# Patient Record
Sex: FEMALE | Race: WHITE | Employment: OTHER | URBAN - METROPOLITAN AREA
[De-identification: names, ages, dates, MRNs, and addresses within clinical notes are randomized per-mention and may not be internally consistent; named-entity substitution may affect disease eponyms.]

---

## 2008-10-28 LAB — COLONOSCOPY, EXTERNAL: NORMAL

## 2017-01-10 ENCOUNTER — OP HISTORICAL/CONVERTED ENCOUNTER (OUTPATIENT)
Dept: OTHER | Age: 82
End: 2017-01-10

## 2017-01-24 ENCOUNTER — OP HISTORICAL/CONVERTED ENCOUNTER (OUTPATIENT)
Dept: OTHER | Age: 82
End: 2017-01-24

## 2017-02-07 ENCOUNTER — OP HISTORICAL/CONVERTED ENCOUNTER (OUTPATIENT)
Dept: OTHER | Age: 82
End: 2017-02-07

## 2017-02-21 ENCOUNTER — OP HISTORICAL/CONVERTED ENCOUNTER (OUTPATIENT)
Dept: OTHER | Age: 82
End: 2017-02-21

## 2017-03-07 ENCOUNTER — OP HISTORICAL/CONVERTED ENCOUNTER (OUTPATIENT)
Dept: OTHER | Age: 82
End: 2017-03-07

## 2017-03-20 ENCOUNTER — OP HISTORICAL/CONVERTED ENCOUNTER (OUTPATIENT)
Dept: OTHER | Age: 82
End: 2017-03-20

## 2017-04-04 ENCOUNTER — OP HISTORICAL/CONVERTED ENCOUNTER (OUTPATIENT)
Dept: OTHER | Age: 82
End: 2017-04-04

## 2017-04-18 ENCOUNTER — OP HISTORICAL/CONVERTED ENCOUNTER (OUTPATIENT)
Dept: OTHER | Age: 82
End: 2017-04-18

## 2017-05-02 ENCOUNTER — OP HISTORICAL/CONVERTED ENCOUNTER (OUTPATIENT)
Dept: OTHER | Age: 82
End: 2017-05-02

## 2017-05-08 ENCOUNTER — OP HISTORICAL/CONVERTED ENCOUNTER (OUTPATIENT)
Dept: OTHER | Age: 82
End: 2017-05-08

## 2017-05-15 ENCOUNTER — OP HISTORICAL/CONVERTED ENCOUNTER (OUTPATIENT)
Dept: OTHER | Age: 82
End: 2017-05-15

## 2017-06-05 ENCOUNTER — OP HISTORICAL/CONVERTED ENCOUNTER (OUTPATIENT)
Dept: OTHER | Age: 82
End: 2017-06-05

## 2017-06-15 ENCOUNTER — OP HISTORICAL/CONVERTED ENCOUNTER (OUTPATIENT)
Dept: OTHER | Age: 82
End: 2017-06-15

## 2017-06-27 ENCOUNTER — OP HISTORICAL/CONVERTED ENCOUNTER (OUTPATIENT)
Dept: OTHER | Age: 82
End: 2017-06-27

## 2017-07-10 ENCOUNTER — OP HISTORICAL/CONVERTED ENCOUNTER (OUTPATIENT)
Dept: OTHER | Age: 82
End: 2017-07-10

## 2017-07-25 ENCOUNTER — OP HISTORICAL/CONVERTED ENCOUNTER (OUTPATIENT)
Dept: OTHER | Age: 82
End: 2017-07-25

## 2017-08-07 ENCOUNTER — OP HISTORICAL/CONVERTED ENCOUNTER (OUTPATIENT)
Dept: OTHER | Age: 82
End: 2017-08-07

## 2017-08-22 ENCOUNTER — OP HISTORICAL/CONVERTED ENCOUNTER (OUTPATIENT)
Dept: OTHER | Age: 82
End: 2017-08-22

## 2017-09-05 ENCOUNTER — OP HISTORICAL/CONVERTED ENCOUNTER (OUTPATIENT)
Dept: OTHER | Age: 82
End: 2017-09-05

## 2017-09-19 ENCOUNTER — OP HISTORICAL/CONVERTED ENCOUNTER (OUTPATIENT)
Dept: OTHER | Age: 82
End: 2017-09-19

## 2017-10-05 ENCOUNTER — OP HISTORICAL/CONVERTED ENCOUNTER (OUTPATIENT)
Dept: OTHER | Age: 82
End: 2017-10-05

## 2017-10-17 ENCOUNTER — OP HISTORICAL/CONVERTED ENCOUNTER (OUTPATIENT)
Dept: OTHER | Age: 82
End: 2017-10-17

## 2017-10-31 ENCOUNTER — OP HISTORICAL/CONVERTED ENCOUNTER (OUTPATIENT)
Dept: OTHER | Age: 82
End: 2017-10-31

## 2017-11-14 ENCOUNTER — OP HISTORICAL/CONVERTED ENCOUNTER (OUTPATIENT)
Dept: OTHER | Age: 82
End: 2017-11-14

## 2017-11-28 ENCOUNTER — OP HISTORICAL/CONVERTED ENCOUNTER (OUTPATIENT)
Dept: OTHER | Age: 82
End: 2017-11-28

## 2017-12-12 ENCOUNTER — OP HISTORICAL/CONVERTED ENCOUNTER (OUTPATIENT)
Dept: OTHER | Age: 82
End: 2017-12-12

## 2017-12-27 ENCOUNTER — OP HISTORICAL/CONVERTED ENCOUNTER (OUTPATIENT)
Dept: OTHER | Age: 82
End: 2017-12-27

## 2018-01-02 ENCOUNTER — OP HISTORICAL/CONVERTED ENCOUNTER (OUTPATIENT)
Dept: OTHER | Age: 83
End: 2018-01-02

## 2018-01-09 ENCOUNTER — OP HISTORICAL/CONVERTED ENCOUNTER (OUTPATIENT)
Dept: OTHER | Age: 83
End: 2018-01-09

## 2018-01-23 ENCOUNTER — OP HISTORICAL/CONVERTED ENCOUNTER (OUTPATIENT)
Dept: OTHER | Age: 83
End: 2018-01-23

## 2018-01-30 ENCOUNTER — OP HISTORICAL/CONVERTED ENCOUNTER (OUTPATIENT)
Dept: OTHER | Age: 83
End: 2018-01-30

## 2018-02-12 ENCOUNTER — OP HISTORICAL/CONVERTED ENCOUNTER (OUTPATIENT)
Dept: OTHER | Age: 83
End: 2018-02-12

## 2018-02-27 ENCOUNTER — OP HISTORICAL/CONVERTED ENCOUNTER (OUTPATIENT)
Dept: OTHER | Age: 83
End: 2018-02-27

## 2018-03-12 ENCOUNTER — OP HISTORICAL/CONVERTED ENCOUNTER (OUTPATIENT)
Dept: OTHER | Age: 83
End: 2018-03-12

## 2018-03-26 ENCOUNTER — OP HISTORICAL/CONVERTED ENCOUNTER (OUTPATIENT)
Dept: OTHER | Age: 83
End: 2018-03-26

## 2018-04-10 ENCOUNTER — OP HISTORICAL/CONVERTED ENCOUNTER (OUTPATIENT)
Dept: OTHER | Age: 83
End: 2018-04-10

## 2018-04-24 ENCOUNTER — OP HISTORICAL/CONVERTED ENCOUNTER (OUTPATIENT)
Dept: OTHER | Age: 83
End: 2018-04-24

## 2018-05-15 ENCOUNTER — OP HISTORICAL/CONVERTED ENCOUNTER (OUTPATIENT)
Dept: OTHER | Age: 83
End: 2018-05-15

## 2018-05-30 ENCOUNTER — OP HISTORICAL/CONVERTED ENCOUNTER (OUTPATIENT)
Dept: OTHER | Age: 83
End: 2018-05-30

## 2018-06-02 ENCOUNTER — IP HISTORICAL/CONVERTED ENCOUNTER (OUTPATIENT)
Dept: OTHER | Age: 83
End: 2018-06-02

## 2018-06-21 ENCOUNTER — OP HISTORICAL/CONVERTED ENCOUNTER (OUTPATIENT)
Dept: OTHER | Age: 83
End: 2018-06-21

## 2018-07-05 ENCOUNTER — OP HISTORICAL/CONVERTED ENCOUNTER (OUTPATIENT)
Dept: OTHER | Age: 83
End: 2018-07-05

## 2018-07-17 ENCOUNTER — OP HISTORICAL/CONVERTED ENCOUNTER (OUTPATIENT)
Dept: OTHER | Age: 83
End: 2018-07-17

## 2018-07-31 ENCOUNTER — OP HISTORICAL/CONVERTED ENCOUNTER (OUTPATIENT)
Dept: OTHER | Age: 83
End: 2018-07-31

## 2018-08-07 ENCOUNTER — OP HISTORICAL/CONVERTED ENCOUNTER (OUTPATIENT)
Dept: OTHER | Age: 83
End: 2018-08-07

## 2018-08-13 ENCOUNTER — OP HISTORICAL/CONVERTED ENCOUNTER (OUTPATIENT)
Dept: OTHER | Age: 83
End: 2018-08-13

## 2018-08-17 ENCOUNTER — OP HISTORICAL/CONVERTED ENCOUNTER (OUTPATIENT)
Dept: OTHER | Age: 83
End: 2018-08-17

## 2018-08-19 LAB — MAMMOGRAPHY, EXTERNAL: NORMAL

## 2018-08-28 ENCOUNTER — OP HISTORICAL/CONVERTED ENCOUNTER (OUTPATIENT)
Dept: OTHER | Age: 83
End: 2018-08-28

## 2018-09-11 ENCOUNTER — OP HISTORICAL/CONVERTED ENCOUNTER (OUTPATIENT)
Dept: OTHER | Age: 83
End: 2018-09-11

## 2018-09-18 ENCOUNTER — OP HISTORICAL/CONVERTED ENCOUNTER (OUTPATIENT)
Dept: OTHER | Age: 83
End: 2018-09-18

## 2018-09-26 ENCOUNTER — OP HISTORICAL/CONVERTED ENCOUNTER (OUTPATIENT)
Dept: OTHER | Age: 83
End: 2018-09-26

## 2018-10-12 ENCOUNTER — OP HISTORICAL/CONVERTED ENCOUNTER (OUTPATIENT)
Dept: OTHER | Age: 83
End: 2018-10-12

## 2018-10-19 ENCOUNTER — OP HISTORICAL/CONVERTED ENCOUNTER (OUTPATIENT)
Dept: OTHER | Age: 83
End: 2018-10-19

## 2018-11-08 ENCOUNTER — OP HISTORICAL/CONVERTED ENCOUNTER (OUTPATIENT)
Dept: OTHER | Age: 83
End: 2018-11-08

## 2018-11-20 ENCOUNTER — OP HISTORICAL/CONVERTED ENCOUNTER (OUTPATIENT)
Dept: OTHER | Age: 83
End: 2018-11-20

## 2018-12-04 ENCOUNTER — OP HISTORICAL/CONVERTED ENCOUNTER (OUTPATIENT)
Dept: OTHER | Age: 83
End: 2018-12-04

## 2018-12-18 ENCOUNTER — OP HISTORICAL/CONVERTED ENCOUNTER (OUTPATIENT)
Dept: OTHER | Age: 83
End: 2018-12-18

## 2019-01-02 ENCOUNTER — OP HISTORICAL/CONVERTED ENCOUNTER (OUTPATIENT)
Dept: OTHER | Age: 84
End: 2019-01-02

## 2019-01-15 ENCOUNTER — OP HISTORICAL/CONVERTED ENCOUNTER (OUTPATIENT)
Dept: OTHER | Age: 84
End: 2019-01-15

## 2019-01-28 ENCOUNTER — OP HISTORICAL/CONVERTED ENCOUNTER (OUTPATIENT)
Dept: OTHER | Age: 84
End: 2019-01-28

## 2019-02-01 ENCOUNTER — OP HISTORICAL/CONVERTED ENCOUNTER (OUTPATIENT)
Dept: OTHER | Age: 84
End: 2019-02-01

## 2019-05-03 ENCOUNTER — OP HISTORICAL/CONVERTED ENCOUNTER (OUTPATIENT)
Dept: OTHER | Age: 84
End: 2019-05-03

## 2019-08-19 ENCOUNTER — OP HISTORICAL/CONVERTED ENCOUNTER (OUTPATIENT)
Dept: OTHER | Age: 84
End: 2019-08-19

## 2020-09-24 ENCOUNTER — HOSPITAL ENCOUNTER (OUTPATIENT)
Dept: MAMMOGRAPHY | Age: 85
Discharge: HOME | End: 2020-09-24
Attending: INTERNAL MEDICINE
Payer: MEDICARE

## 2020-09-24 DIAGNOSIS — Z12.31 ENCOUNTER FOR SCREENING MAMMOGRAM FOR MALIGNANT NEOPLASM OF BREAST: ICD-10-CM

## 2020-09-24 PROCEDURE — 77067 SCR MAMMO BI INCL CAD: CPT

## 2021-07-06 ENCOUNTER — TRANSCRIBE ORDER (OUTPATIENT)
Dept: SCHEDULING | Age: 86
End: 2021-07-06

## 2021-07-06 DIAGNOSIS — M67.813 OTHER SPECIFIED DISORDERS OF TENDON, RIGHT SHOULDER: Primary | ICD-10-CM

## 2021-07-07 ENCOUNTER — HOSPITAL ENCOUNTER (OUTPATIENT)
Dept: MRI IMAGING | Age: 86
Discharge: HOME | End: 2021-07-07
Attending: ORTHOPAEDIC SURGERY
Payer: MEDICARE

## 2021-07-07 DIAGNOSIS — M67.813 OTHER SPECIFIED DISORDERS OF TENDON, RIGHT SHOULDER: ICD-10-CM

## 2021-07-07 PROCEDURE — 73221 MRI JOINT UPR EXTREM W/O DYE: CPT

## 2021-08-02 ENCOUNTER — HOSPITAL ENCOUNTER (OUTPATIENT)
Dept: PHYSICAL THERAPY | Age: 86
Discharge: HOME | End: 2021-08-02
Payer: MEDICARE

## 2021-08-02 PROCEDURE — 97110 THERAPEUTIC EXERCISES: CPT

## 2021-08-02 PROCEDURE — 97161 PT EVAL LOW COMPLEX 20 MIN: CPT

## 2021-08-02 NOTE — PROGRESS NOTES
W . [address] [place] [address], [address] Charlene Baird  Phone: [phone]   Fax: [phone]    PT INITIAL EVALUATION NOTE - [facility] 2-15  Plan of Care/Statement of Necessity for Physical Therapy Services  2-15    Patient Name: Alma Murphy  Date:2021  : 1933  [x]  Patient  Verified  Provider#: [id number]  Payor: [place] MEDICARE / Plan: [place] MEDICARE PART A & B / Product Type: Medicare /    Referral source: Seema Cohen MD   In time:300 pm  Out time:330 pm  Total Treatment Time (min): 30  Total Timed Codes (min): 10  1:1 Treatment Time ([address] only): 10   Visit #: 1      Start of Care: 21      Onset Date: 21     Treatment Area/Diagnosis: Pain in right shoulder [M25.511]    SUBJECTIVE  Pain Level (0-10 scale): 0                Best: 0           Worst:  0  Description: no pain   Any medication changes, allergies to medications, adverse drug reactions, diagnosis change, or new procedure performed?: [] No    [x] Yes (see summary sheet for update)    Subjective:    On 21 - patient fell when she misjudged a step and fractured her R collar bone. She was taken to [facility] with x-ray of head and shoulder which revealed fracture of her collar bone. She then saw an orthopedic MD and wore a sling for 2 weeks. On a f/u with her MD on 21, the MD said that he did not see evidence of a fracture, discontinued the use of the sling and referred patient to PT. She feels that she does not need PT because she is back to doing everything with out issue, but hasn't ironed yet because she is unsure of getting her \"old\" ironing board out and setting it up. Her daughter has been driving her to appointments, doing her laundry and grocery shopping. Her daughter feels that she is not back to her \"normal\" isn't letting her drive. Daughter also feels that she needs PT. Patient is R hand dominant.     Patient reports no difficulty with any ADLS other than ironing. PLOF: independent living including driving, went to exercise class at [address] of Injury: fall  Previous Treatment/Compliance: sling - complaint  PMHx:  Surgical Hx: heart disease, depression arthritis, thyroid problem, asthma?   Prior Hospitalization: see medical history   Medications: Verified on Patient Summary List  Work Hx: Retired  Living Situation: Lives alone in senior apartment  Pt Goals: doesn't think she needs PT  Barriers: none  Motivation: good  Substance use: yes - tobacco  Cognition: A & O x 3        OBJECTIVE/EXAMINATION  Posture:  Depressed R shoulder  Functional  and Pinch:  WFL  Palpation: slight tenderness over R biceps    Specific joints: *normal values in ()    SHOULDER                                         AROM   PROM            MMT   R L R L R L   Flexion (180)   142 deg   140 deg   150 deg   150 deg     Extension (60)   wfl   wfl       Abduction (180)   140   140       Adduction (0)         IR (70)   65   65     4  4+   ER (90)   75   75     4   4+   Horizontal Abduction (130)         Horizontal Adduction (45)         Additional comments: no pain with any movement, scapular elevation and retraction were WFL and painfree    ELBOW                             AROM                             PROM                             MMT   R L R L R L   Flexion (150)   wfl   wfl     4+   4+   Extension (0)   wfl   wfl     4+   4+   Additional comments: no pain        Neurological: Sensations: grossly intact to light touch    10 min Therapeutic Exercise:  [x] See flow sheet :   Rationale: increase ROM and increase strength to improve the patients ability to return to driving and ironing          With   [] TE   [] TA   [] neuro   [] other: Patient Education: [x] Review HEP    [] Progressed/Changed HEP based on:   [] positioning   [] body mechanics   [] transfers   [] heat/ice application    [] other:      TU sec    Pain Level (0-10 scale) post treatment: 0    ASSESSMENT/Key Information/Changes in Function:   Patient is 80year old who is referred to PT with diagnosis of R shoulder pain. Patient sustained a fall on 6/27/21 when she misjudged a step and fell onto her R side injuring her R clavicle. She was taken to [facility] and had x-rays which diagnosed a fracture in her R clavicle. She was then seen by ortho who had her wear a sling for 2 weeks. After wearing the sling, she has been doing ROM exercises on her own and is now back to AROM equal to the L UE. Her MMT was slightly decreased but not significantly. She only had mild tenderness over the R biceps. She reports that she is independent with her ADLS but hasn't tried getting her ironing board out and ironing. Her daughter has been doing her laundry, grocery shopping and driving. She should benefit from skilled PT to improve the few impairments listed above so that she is able to do the last of her ADLS independently. Problem List: decrease strength and decrease ADL/ functional abilitiies    Short Term Goals: To be accomplished in 3 weeks:   1. Independent with HEP. 2.  Strength equal to L UE. Long Term Goals: To be accomplished in 6 weeks:   1. Resume ironing without difficulty. 2.  Resume grocery shopping without difficulty. 3.  Resume laundry without difficulty. 4.  Resume driving without difficulty.   Patient Goal (s): back to all her normal activities    Evaluation Complexity History LOW Complexity : Zero comorbidities / personal factors that will impact the outcome / POC; Examination LOW Complexity : 1-2 Standardized tests and measures addressing body structure, function, activity limitation and / or participation in recreation  ;Presentation LOW Complexity : Stable, uncomplicated  ;Clinical Decision Making Other outcome measures [facility] 25.96%  LOW   Overall Complexity Rating: LOW      Patient / Family readiness to learn indicated by: asking questions, trying to perform skills and interest  Persons(s) to be included in education: patient (P) and family support person (FSP);list daughter  Barriers to Learning/Limitations: None  Patient Self Reported Health Status: poor  Rehabilitation Potential: excellent  Patient/ Caregiver education and instruction: exercises      PLAN:  Treatment Plan may include any combination of the following: Therapeutic exercise, Manual therapy and Patient education  HEP Issued: see enclosed copy    Frequency / Duration: Patient to be seen 2 times per week for 4-6 weeks. [x]  Plan of care has been reviewed with PTA    Certification Period: 8/2/21  to  10/2/21    Chio Gardner, PT 8/2/2021     ________________________________________________________________________    I certify that the above Therapy Services are being furnished while the patient is under my care. I agree with the treatment plan and certify that this therapy is necessary.     Physician's Signature:____________________  Date:____________Time: _________            Gabby Saenz MD

## 2021-08-03 ENCOUNTER — APPOINTMENT (OUTPATIENT)
Dept: PHYSICAL THERAPY | Age: 86
End: 2021-08-03
Payer: MEDICARE

## 2021-08-05 ENCOUNTER — HOSPITAL ENCOUNTER (OUTPATIENT)
Dept: PHYSICAL THERAPY | Age: 86
Discharge: HOME | End: 2021-08-05
Payer: MEDICARE

## 2021-08-05 PROCEDURE — 97110 THERAPEUTIC EXERCISES: CPT

## 2021-08-05 NOTE — PROGRESS NOTES
PT DAILY TREATMENT NOTE - [facility] 2-15    Patient Name: Chao Young  Date:2021  : 1933  [x]  Patient  Verified  Payor: VA MEDICARE / Plan: VA MEDICARE PART A & B / Product Type: Medicare /    In time:  Out time:165  Total Treatment Time (min): 35  Total Timed Codes (min): 25  1:1 Treatment Time ( only): 35   Visit #: 2 2    Treatment Area: Pain in right shoulder [M25.511]    SUBJECTIVE  Pain Level (0-10 scale): 0  Any medication changes, allergies to medications, adverse drug reactions, diagnosis change, or new procedure performed?: [x] No    [] Yes (see summary sheet for update)  Subjective functional status/changes:   [] No changes reported  I am worn out today. OBJECTIVE      25 min Therapeutic Exercise:  [x] See flow sheet :   Rationale: increase ROM, increase strength, improve coordination, improve balance and increase proprioception to improve the patients ability to perform ADL's and IADL's. With   [] TE   [] TA   [] Neuro   [] SC   [] other: Patient Education: [x] Review HEP    [] Progressed/Changed HEP based on:   [] positioning   [] body mechanics   [] transfers   [] heat/ice application    [] other:      Other Objective/Functional Measures: none    Pain Level (0-10 scale) post treatment: 0    ASSESSMENT/Changes in Function:   Exhibits normal AAROM for shoulder flexion abduction. Fatigued with UBE. Reviewed HEP Ankle pumps, LAQ, MARCH, HIP ABD, SIT to stand. Standing Head, toe, knee toe and demo good form and balance. Patient will continue to benefit from skilled PT services to address functional mobility deficits to attain remaining goals. [x]  See Plan of Care  []  See progress note/recertification  []  See Discharge Summary         Progress towards goals / Updated goals:  Short Term Goals: To be accomplished in 3 weeks:              1.  Independent with HEP. 2.  Strength equal to L UE. Long Term Goals:  To be accomplished in 6 weeks:              1.  Resume ironing without difficulty. 2.  Resume grocery shopping without difficulty. 3.  Resume laundry without difficulty. 4.  Resume driving without difficulty.   Patient Goal (s): back to all her normal activities    PLAN  []  Upgrade activities as tolerated     [x]  Continue plan of care  []  Update interventions per flow sheet       []  Discharge due to:_  []  Other:_      Shae Ellison, PTA 8/5/2021

## 2021-08-09 ENCOUNTER — HOSPITAL ENCOUNTER (OUTPATIENT)
Dept: PHYSICAL THERAPY | Age: 86
Discharge: HOME | End: 2021-08-09
Payer: MEDICARE

## 2021-08-09 PROCEDURE — 97110 THERAPEUTIC EXERCISES: CPT

## 2021-08-09 NOTE — PROGRESS NOTES
PT DAILY TREATMENT NOTE - [facility] 2-15    Patient Name: Dana Longoria  Date:2021  : 1933  [x]  Patient  Verified  Payor: VA MEDICARE / Plan: VA MEDICARE PART A & B / Product Type: Medicare /    In time:1054  Out time:1130  Total Treatment Time (min): 30  Total Timed Codes (min): 30  1:1 Treatment Time ( only): 30   Visit #: 3 3    Treatment Area: Pain in right shoulder [M25.511]    SUBJECTIVE  Pain Level (0-10 scale): 0  Any medication changes, allergies to medications, adverse drug reactions, diagnosis change, or new procedure performed?: [x] No    [] Yes (see summary sheet for update)  Subjective functional status/changes:   [] No changes reported  My nerves are just shot this morning. I got [employer] transportation to take me. I thought my appointment was tomorrow. OBJECTIVE      30 min Therapeutic Exercise:  [x] See flow sheet :   Rationale: increase ROM, increase strength, improve coordination, improve balance and increase proprioception to improve the patients ability to perform ADL's and IADL's. With   [] TE   [] TA   [] Neuro   [] SC   [] other: Patient Education: [x] Review HEP    [] Progressed/Changed HEP based on:   [] positioning   [] body mechanics   [] transfers   [] heat/ice application    [] other:      Other Objective/Functional Measures: none    Pain Level (0-10 scale) post treatment: 0    ASSESSMENT/Changes in Function:   Pt. Very distraught this morning. Assessed SPO2 92% PULSE 89BPM /82. Very fatigued at end of therapy. Performed all TE WNL. Patient will continue to benefit from skilled PT services to address functional mobility deficits to attain remaining goals. [x]  See Plan of Care  []  See progress note/recertification  []  See Discharge Summary         Progress towards goals / Updated goals:  Short Term Goals: To be accomplished in 3 weeks:              1.  Independent with HEP. 2.  Strength equal to L UE. Long Term Goals:  To be accomplished in 6 weeks:              1.  Resume ironing without difficulty. 2.  Resume grocery shopping without difficulty. 3.  Resume laundry without difficulty. 4.  Resume driving without difficulty.   Patient Goal (s): back to all her normal activities    PLAN  []  Upgrade activities as tolerated     [x]  Continue plan of care  []  Update interventions per flow sheet       []  Discharge due to:_  []  Other:_      Sea Hess, PTA 8/9/2021

## 2021-08-12 ENCOUNTER — APPOINTMENT (OUTPATIENT)
Dept: PHYSICAL THERAPY | Age: 86
End: 2021-08-12
Payer: MEDICARE

## 2021-08-18 ENCOUNTER — HOSPITAL ENCOUNTER (INPATIENT)
Age: 86
LOS: 4 days | Discharge: NURSING, REHAB OR LONG-TERM CARE | DRG: 193 | End: 2021-08-23
Attending: STUDENT IN AN ORGANIZED HEALTH CARE EDUCATION/TRAINING PROGRAM | Admitting: INTERNAL MEDICINE
Payer: MEDICARE

## 2021-08-18 DIAGNOSIS — D72.829 LEUKOCYTOSIS, UNSPECIFIED TYPE: ICD-10-CM

## 2021-08-18 DIAGNOSIS — J18.9 COMMUNITY ACQUIRED PNEUMONIA, UNSPECIFIED LATERALITY: Primary | ICD-10-CM

## 2021-08-18 DIAGNOSIS — E83.42 HYPOMAGNESEMIA: ICD-10-CM

## 2021-08-18 DIAGNOSIS — E87.6 HYPOKALEMIA: ICD-10-CM

## 2021-08-18 DIAGNOSIS — R79.89 ELEVATED BRAIN NATRIURETIC PEPTIDE (BNP) LEVEL: ICD-10-CM

## 2021-08-18 PROCEDURE — 99285 EMERGENCY DEPT VISIT HI MDM: CPT

## 2021-08-18 NOTE — Clinical Note
Status[de-identified] INPATIENT [101]   Type of Bed: Medical [8]   Cardiac Monitoring Required?: Yes   Inpatient Hospitalization Certified Necessary for the Following Reasons: 3.  Patient receiving treatment that can only be provided in an inpatient setting (further clarification in H&P documentation)   Admitting Diagnosis: Hypokalemia [463709]   Admitting Diagnosis: Hypomagnesemia [002368]   Admitting Diagnosis: Pneumonia [469614]   Admitting Physician: Virgie Aguirre   Attending Physician: Virgie Aguirre   Estimated Length of Stay: 2 Midnights   Discharge Plan[de-identified] Home with Office Follow-up

## 2021-08-19 ENCOUNTER — APPOINTMENT (OUTPATIENT)
Dept: GENERAL RADIOLOGY | Age: 86
DRG: 193 | End: 2021-08-19
Attending: STUDENT IN AN ORGANIZED HEALTH CARE EDUCATION/TRAINING PROGRAM
Payer: MEDICARE

## 2021-08-19 PROBLEM — R94.31 ABNORMAL EKG: Status: ACTIVE | Noted: 2021-08-19

## 2021-08-19 PROBLEM — E83.42 HYPOMAGNESEMIA: Status: ACTIVE | Noted: 2021-08-19

## 2021-08-19 PROBLEM — E87.1 HYPONATREMIA: Status: ACTIVE | Noted: 2021-08-19

## 2021-08-19 PROBLEM — J18.9 PNEUMONIA: Status: ACTIVE | Noted: 2021-08-19

## 2021-08-19 PROBLEM — E87.6 HYPOKALEMIA: Status: ACTIVE | Noted: 2021-08-19

## 2021-08-19 LAB
ANION GAP SERPL CALC-SCNC: 5 MMOL/L (ref 5–15)
ANION GAP SERPL CALC-SCNC: 7 MMOL/L (ref 5–15)
ANION GAP SERPL CALC-SCNC: 9 MMOL/L (ref 5–15)
APPEARANCE UR: CLEAR
APTT PPP: 65.5 SEC (ref 21.2–34.1)
BACTERIA URNS QL MICRO: NEGATIVE /HPF
BASOPHILS # BLD: 0.1 K/UL (ref 0–0.1)
BASOPHILS NFR BLD: 0 % (ref 0–1)
BILIRUB UR QL: NEGATIVE
BNP SERPL-MCNC: 1874 PG/ML
BUN SERPL-MCNC: 13 MG/DL (ref 6–20)
BUN SERPL-MCNC: 8 MG/DL (ref 6–20)
BUN SERPL-MCNC: 9 MG/DL (ref 6–20)
BUN/CREAT SERPL: 14 (ref 12–20)
BUN/CREAT SERPL: 14 (ref 12–20)
BUN/CREAT SERPL: 22 (ref 12–20)
CA-I BLD-MCNC: 7.3 MG/DL (ref 8.5–10.1)
CA-I BLD-MCNC: 7.4 MG/DL (ref 8.5–10.1)
CA-I BLD-MCNC: 7.7 MG/DL (ref 8.5–10.1)
CHLORIDE SERPL-SCNC: 86 MMOL/L (ref 97–108)
CHLORIDE SERPL-SCNC: 92 MMOL/L (ref 97–108)
CHLORIDE SERPL-SCNC: 95 MMOL/L (ref 97–108)
CO2 SERPL-SCNC: 30 MMOL/L (ref 21–32)
CO2 SERPL-SCNC: 30 MMOL/L (ref 21–32)
CO2 SERPL-SCNC: 31 MMOL/L (ref 21–32)
COLOR UR: NORMAL
COVID-19 RAPID TEST, COVR: NOT DETECTED
CREAT SERPL-MCNC: 0.59 MG/DL (ref 0.55–1.02)
CREAT SERPL-MCNC: 0.6 MG/DL (ref 0.55–1.02)
CREAT SERPL-MCNC: 0.64 MG/DL (ref 0.55–1.02)
D DIMER PPP FEU-MCNC: 0.72 UG/ML(FEU)
DIFFERENTIAL METHOD BLD: ABNORMAL
EOSINOPHIL # BLD: 0.1 K/UL (ref 0–0.4)
EOSINOPHIL NFR BLD: 0 % (ref 0–7)
ERYTHROCYTE [DISTWIDTH] IN BLOOD BY AUTOMATED COUNT: 12.2 % (ref 11.5–14.5)
GLUCOSE SERPL-MCNC: 119 MG/DL (ref 65–100)
GLUCOSE SERPL-MCNC: 128 MG/DL (ref 65–100)
GLUCOSE SERPL-MCNC: 141 MG/DL (ref 65–100)
GLUCOSE UR STRIP.AUTO-MCNC: NEGATIVE MG/DL
HCT VFR BLD AUTO: 32.5 % (ref 35–47)
HGB BLD-MCNC: 11.2 G/DL (ref 11.5–16)
HGB UR QL STRIP: NEGATIVE
IMM GRANULOCYTES # BLD AUTO: 0.1 K/UL (ref 0–0.04)
IMM GRANULOCYTES NFR BLD AUTO: 1 % (ref 0–0.5)
INR PPP: 2.7 (ref 0.9–1.1)
KETONES UR QL STRIP.AUTO: NEGATIVE MG/DL
LEUKOCYTE ESTERASE UR QL STRIP.AUTO: NEGATIVE
LYMPHOCYTES # BLD: 2.9 K/UL (ref 0.8–3.5)
LYMPHOCYTES NFR BLD: 15 % (ref 12–49)
MAGNESIUM SERPL-MCNC: 0.8 MG/DL (ref 1.6–2.4)
MAGNESIUM SERPL-MCNC: 2.2 MG/DL (ref 1.6–2.4)
MAGNESIUM SERPL-MCNC: 8.3 MG/DL (ref 1.6–2.4)
MCH RBC QN AUTO: 30.9 PG (ref 26–34)
MCHC RBC AUTO-ENTMCNC: 34.5 G/DL (ref 30–36.5)
MCV RBC AUTO: 89.5 FL (ref 80–99)
MONOCYTES # BLD: 1.4 K/UL (ref 0–1)
MONOCYTES NFR BLD: 7 % (ref 5–13)
NEUTS SEG # BLD: 15.2 K/UL (ref 1.8–8)
NEUTS SEG NFR BLD: 77 % (ref 32–75)
NITRITE UR QL STRIP.AUTO: NEGATIVE
NRBC # BLD: 0 K/UL (ref 0–0.01)
NRBC BLD-RTO: 0 PER 100 WBC
PH UR STRIP: 6 [PH] (ref 5–8)
PLATELET # BLD AUTO: 252 K/UL (ref 150–400)
PMV BLD AUTO: 9.9 FL (ref 8.9–12.9)
POTASSIUM SERPL-SCNC: 2.9 MMOL/L (ref 3.5–5.1)
POTASSIUM SERPL-SCNC: 3.4 MMOL/L (ref 3.5–5.1)
POTASSIUM SERPL-SCNC: 3.8 MMOL/L (ref 3.5–5.1)
PROT UR STRIP-MCNC: NEGATIVE MG/DL
PROTHROMBIN TIME: 28.7 SEC (ref 11.9–14.7)
RBC # BLD AUTO: 3.63 M/UL (ref 3.8–5.2)
RBC #/AREA URNS HPF: NORMAL /HPF (ref 0–5)
SARS-COV-2, COV2: NORMAL
SODIUM SERPL-SCNC: 125 MMOL/L (ref 136–145)
SODIUM SERPL-SCNC: 129 MMOL/L (ref 136–145)
SODIUM SERPL-SCNC: 131 MMOL/L (ref 136–145)
SP GR UR REFRACTOMETRY: <1.005 (ref 1–1.03)
SPECIMEN SOURCE: NORMAL
THERAPEUTIC RANGE,PTTT: ABNORMAL SEC (ref 82–109)
TROPONIN I SERPL-MCNC: <0.05 NG/ML
TROPONIN I SERPL-MCNC: <0.05 NG/ML
UA: UC IF INDICATED,UAUC: NORMAL
UROBILINOGEN UR QL STRIP.AUTO: 0.1 EU/DL (ref 0.1–1)
WBC # BLD AUTO: 19.7 K/UL (ref 3.6–11)
WBC URNS QL MICRO: NORMAL /HPF (ref 0–4)

## 2021-08-19 PROCEDURE — 80048 BASIC METABOLIC PNL TOTAL CA: CPT

## 2021-08-19 PROCEDURE — 96376 TX/PRO/DX INJ SAME DRUG ADON: CPT

## 2021-08-19 PROCEDURE — 85730 THROMBOPLASTIN TIME PARTIAL: CPT

## 2021-08-19 PROCEDURE — 65270000029 HC RM PRIVATE

## 2021-08-19 PROCEDURE — 74011250637 HC RX REV CODE- 250/637: Performed by: STUDENT IN AN ORGANIZED HEALTH CARE EDUCATION/TRAINING PROGRAM

## 2021-08-19 PROCEDURE — 74011250636 HC RX REV CODE- 250/636: Performed by: HOSPITALIST

## 2021-08-19 PROCEDURE — 85379 FIBRIN DEGRADATION QUANT: CPT

## 2021-08-19 PROCEDURE — 83880 ASSAY OF NATRIURETIC PEPTIDE: CPT

## 2021-08-19 PROCEDURE — 93005 ELECTROCARDIOGRAM TRACING: CPT

## 2021-08-19 PROCEDURE — 81001 URINALYSIS AUTO W/SCOPE: CPT

## 2021-08-19 PROCEDURE — 74011000258 HC RX REV CODE- 258: Performed by: INTERNAL MEDICINE

## 2021-08-19 PROCEDURE — 83735 ASSAY OF MAGNESIUM: CPT

## 2021-08-19 PROCEDURE — 74011000250 HC RX REV CODE- 250: Performed by: INTERNAL MEDICINE

## 2021-08-19 PROCEDURE — 74011000250 HC RX REV CODE- 250: Performed by: STUDENT IN AN ORGANIZED HEALTH CARE EDUCATION/TRAINING PROGRAM

## 2021-08-19 PROCEDURE — 74011250637 HC RX REV CODE- 250/637: Performed by: INTERNAL MEDICINE

## 2021-08-19 PROCEDURE — 85610 PROTHROMBIN TIME: CPT

## 2021-08-19 PROCEDURE — 74011636637 HC RX REV CODE- 636/637: Performed by: INTERNAL MEDICINE

## 2021-08-19 PROCEDURE — 51702 INSERT TEMP BLADDER CATH: CPT

## 2021-08-19 PROCEDURE — 87635 SARS-COV-2 COVID-19 AMP PRB: CPT

## 2021-08-19 PROCEDURE — 85025 COMPLETE CBC W/AUTO DIFF WBC: CPT

## 2021-08-19 PROCEDURE — 74011250636 HC RX REV CODE- 250/636: Performed by: STUDENT IN AN ORGANIZED HEALTH CARE EDUCATION/TRAINING PROGRAM

## 2021-08-19 PROCEDURE — 74011250636 HC RX REV CODE- 250/636: Performed by: INTERNAL MEDICINE

## 2021-08-19 PROCEDURE — 71045 X-RAY EXAM CHEST 1 VIEW: CPT

## 2021-08-19 PROCEDURE — 96365 THER/PROPH/DIAG IV INF INIT: CPT

## 2021-08-19 PROCEDURE — 84484 ASSAY OF TROPONIN QUANT: CPT

## 2021-08-19 PROCEDURE — 96375 TX/PRO/DX INJ NEW DRUG ADDON: CPT

## 2021-08-19 PROCEDURE — 36415 COLL VENOUS BLD VENIPUNCTURE: CPT

## 2021-08-19 RX ORDER — DILTIAZEM HYDROCHLORIDE 120 MG/1
240 CAPSULE, COATED, EXTENDED RELEASE ORAL DAILY
Status: DISCONTINUED | OUTPATIENT
Start: 2021-08-19 | End: 2021-08-23 | Stop reason: HOSPADM

## 2021-08-19 RX ORDER — POTASSIUM CHLORIDE 20 MEQ/1
40 TABLET, EXTENDED RELEASE ORAL
Status: COMPLETED | OUTPATIENT
Start: 2021-08-19 | End: 2021-08-19

## 2021-08-19 RX ORDER — TRAMADOL HYDROCHLORIDE 50 MG/1
50 TABLET ORAL
COMMUNITY
Start: 2021-07-09

## 2021-08-19 RX ORDER — DILTIAZEM HYDROCHLORIDE 120 MG/1
240 CAPSULE, EXTENDED RELEASE ORAL
COMMUNITY
Start: 2021-02-08 | End: 2022-02-03

## 2021-08-19 RX ORDER — PROPRANOLOL HYDROCHLORIDE 20 MG/1
10 TABLET ORAL 2 TIMES DAILY
Status: DISCONTINUED | OUTPATIENT
Start: 2021-08-19 | End: 2021-08-23 | Stop reason: HOSPADM

## 2021-08-19 RX ORDER — ACETAMINOPHEN 325 MG/1
650 TABLET ORAL
Status: DISCONTINUED | OUTPATIENT
Start: 2021-08-19 | End: 2021-08-19

## 2021-08-19 RX ORDER — WARFARIN 3 MG/1
3 TABLET ORAL ONCE
Status: COMPLETED | OUTPATIENT
Start: 2021-08-19 | End: 2021-08-19

## 2021-08-19 RX ORDER — DULOXETIN HYDROCHLORIDE 30 MG/1
60 CAPSULE, DELAYED RELEASE ORAL DAILY
Status: DISCONTINUED | OUTPATIENT
Start: 2021-08-19 | End: 2021-08-23 | Stop reason: HOSPADM

## 2021-08-19 RX ORDER — SODIUM CHLORIDE 9 MG/ML
75 INJECTION, SOLUTION INTRAVENOUS CONTINUOUS
Status: DISPENSED | OUTPATIENT
Start: 2021-08-19 | End: 2021-08-19

## 2021-08-19 RX ORDER — DULOXETIN HYDROCHLORIDE 60 MG/1
60 CAPSULE, DELAYED RELEASE ORAL
COMMUNITY
Start: 2021-07-10

## 2021-08-19 RX ORDER — ONDANSETRON 2 MG/ML
4 INJECTION INTRAMUSCULAR; INTRAVENOUS
Status: DISCONTINUED | OUTPATIENT
Start: 2021-08-19 | End: 2021-08-23 | Stop reason: HOSPADM

## 2021-08-19 RX ORDER — TRAMADOL HYDROCHLORIDE 50 MG/1
50 TABLET ORAL
Status: DISCONTINUED | OUTPATIENT
Start: 2021-08-19 | End: 2021-08-23 | Stop reason: HOSPADM

## 2021-08-19 RX ORDER — ALPRAZOLAM 0.25 MG/1
0.25 TABLET ORAL
COMMUNITY
Start: 2021-08-18

## 2021-08-19 RX ORDER — POTASSIUM CHLORIDE 7.45 MG/ML
10 INJECTION INTRAVENOUS
Status: COMPLETED | OUTPATIENT
Start: 2021-08-19 | End: 2021-08-19

## 2021-08-19 RX ORDER — ONDANSETRON 4 MG/1
4 TABLET, ORALLY DISINTEGRATING ORAL
Status: DISCONTINUED | OUTPATIENT
Start: 2021-08-19 | End: 2021-08-23 | Stop reason: HOSPADM

## 2021-08-19 RX ORDER — SODIUM CHLORIDE 0.9 % (FLUSH) 0.9 %
5-40 SYRINGE (ML) INJECTION AS NEEDED
Status: DISCONTINUED | OUTPATIENT
Start: 2021-08-19 | End: 2021-08-23 | Stop reason: HOSPADM

## 2021-08-19 RX ORDER — SODIUM CHLORIDE 9 MG/ML
75 INJECTION, SOLUTION INTRAVENOUS CONTINUOUS
Status: DISPENSED | OUTPATIENT
Start: 2021-08-19 | End: 2021-08-20

## 2021-08-19 RX ORDER — POLYETHYLENE GLYCOL 3350 17 G/17G
17 POWDER, FOR SOLUTION ORAL DAILY PRN
Status: DISCONTINUED | OUTPATIENT
Start: 2021-08-19 | End: 2021-08-23 | Stop reason: HOSPADM

## 2021-08-19 RX ORDER — ACETAMINOPHEN 325 MG/1
650 TABLET ORAL
Status: DISCONTINUED | OUTPATIENT
Start: 2021-08-19 | End: 2021-08-23 | Stop reason: HOSPADM

## 2021-08-19 RX ORDER — PREDNISONE 2.5 MG/1
2.5 TABLET ORAL DAILY
COMMUNITY
Start: 2021-06-03

## 2021-08-19 RX ORDER — ACETAMINOPHEN 650 MG/1
650 SUPPOSITORY RECTAL
Status: DISCONTINUED | OUTPATIENT
Start: 2021-08-19 | End: 2021-08-23 | Stop reason: HOSPADM

## 2021-08-19 RX ORDER — LEVOTHYROXINE SODIUM 125 UG/1
125 TABLET ORAL
COMMUNITY
Start: 2021-07-06

## 2021-08-19 RX ORDER — GUAIFENESIN 100 MG/5ML
324 LIQUID (ML) ORAL
Status: COMPLETED | OUTPATIENT
Start: 2021-08-19 | End: 2021-08-19

## 2021-08-19 RX ORDER — MAGNESIUM SULFATE HEPTAHYDRATE 40 MG/ML
2 INJECTION, SOLUTION INTRAVENOUS
Status: COMPLETED | OUTPATIENT
Start: 2021-08-19 | End: 2021-08-19

## 2021-08-19 RX ORDER — PROPRANOLOL HYDROCHLORIDE 10 MG/1
10 TABLET ORAL 2 TIMES DAILY
COMMUNITY
Start: 2021-07-12

## 2021-08-19 RX ORDER — WARFARIN 1 MG/1
TABLET ORAL
COMMUNITY
Start: 2021-07-10

## 2021-08-19 RX ORDER — PREDNISONE 5 MG/1
2.5 TABLET ORAL DAILY
Status: DISCONTINUED | OUTPATIENT
Start: 2021-08-19 | End: 2021-08-23 | Stop reason: HOSPADM

## 2021-08-19 RX ORDER — ACETAMINOPHEN 650 MG/1
660 SUPPOSITORY RECTAL
Status: DISCONTINUED | OUTPATIENT
Start: 2021-08-19 | End: 2021-08-19

## 2021-08-19 RX ORDER — SODIUM CHLORIDE 0.9 % (FLUSH) 0.9 %
5-40 SYRINGE (ML) INJECTION EVERY 8 HOURS
Status: DISCONTINUED | OUTPATIENT
Start: 2021-08-19 | End: 2021-08-23 | Stop reason: HOSPADM

## 2021-08-19 RX ADMIN — CEFTRIAXONE 1 G: 1 INJECTION, POWDER, FOR SOLUTION INTRAMUSCULAR; INTRAVENOUS at 05:03

## 2021-08-19 RX ADMIN — Medication 10 ML: at 06:00

## 2021-08-19 RX ADMIN — SODIUM CHLORIDE 75 ML/HR: 9 INJECTION, SOLUTION INTRAVENOUS at 22:59

## 2021-08-19 RX ADMIN — PROPRANOLOL HYDROCHLORIDE 10 MG: 20 TABLET ORAL at 10:01

## 2021-08-19 RX ADMIN — Medication 10 ML: at 21:09

## 2021-08-19 RX ADMIN — POTASSIUM CHLORIDE 10 MEQ: 7.46 INJECTION, SOLUTION INTRAVENOUS at 06:34

## 2021-08-19 RX ADMIN — DOXYCYCLINE 100 MG: 100 INJECTION, POWDER, LYOPHILIZED, FOR SOLUTION INTRAVENOUS at 12:05

## 2021-08-19 RX ADMIN — POTASSIUM CHLORIDE 10 MEQ: 7.46 INJECTION, SOLUTION INTRAVENOUS at 05:34

## 2021-08-19 RX ADMIN — SODIUM CHLORIDE 75 ML/HR: 9 INJECTION, SOLUTION INTRAVENOUS at 05:28

## 2021-08-19 RX ADMIN — DULOXETINE HYDROCHLORIDE 60 MG: 30 CAPSULE, DELAYED RELEASE ORAL at 08:51

## 2021-08-19 RX ADMIN — PROPRANOLOL HYDROCHLORIDE 10 MG: 20 TABLET ORAL at 21:00

## 2021-08-19 RX ADMIN — CEFTRIAXONE 1 G: 1 INJECTION, POWDER, FOR SOLUTION INTRAMUSCULAR; INTRAVENOUS at 20:50

## 2021-08-19 RX ADMIN — DOXYCYCLINE 100 MG: 100 INJECTION, POWDER, LYOPHILIZED, FOR SOLUTION INTRAVENOUS at 20:52

## 2021-08-19 RX ADMIN — ASPIRIN 324 MG: 81 TABLET, CHEWABLE ORAL at 05:04

## 2021-08-19 RX ADMIN — Medication 10 ML: at 13:38

## 2021-08-19 RX ADMIN — LEVOTHYROXINE SODIUM 125 MCG: 0.03 TABLET ORAL at 08:51

## 2021-08-19 RX ADMIN — POTASSIUM CHLORIDE 40 MEQ: 1500 TABLET, EXTENDED RELEASE ORAL at 05:09

## 2021-08-19 RX ADMIN — PREDNISONE 2.5 MG: 5 TABLET ORAL at 10:01

## 2021-08-19 RX ADMIN — MAGNESIUM SULFATE HEPTAHYDRATE 3 G: 500 INJECTION, SOLUTION INTRAMUSCULAR; INTRAVENOUS at 06:37

## 2021-08-19 RX ADMIN — MAGNESIUM SULFATE HEPTAHYDRATE 2 G: 40 INJECTION, SOLUTION INTRAVENOUS at 02:01

## 2021-08-19 RX ADMIN — DILTIAZEM HYDROCHLORIDE 240 MG: 120 CAPSULE, COATED, EXTENDED RELEASE ORAL at 10:01

## 2021-08-19 RX ADMIN — WARFARIN SODIUM 3 MG: 3 TABLET ORAL at 17:49

## 2021-08-19 RX ADMIN — ACETAMINOPHEN 325 MG: 325 TABLET ORAL at 11:58

## 2021-08-19 RX ADMIN — AZITHROMYCIN 500 MG: 500 INJECTION, POWDER, LYOPHILIZED, FOR SOLUTION INTRAVENOUS at 05:36

## 2021-08-19 RX ADMIN — TRAMADOL HYDROCHLORIDE 50 MG: 50 TABLET, FILM COATED ORAL at 05:38

## 2021-08-19 NOTE — ROUTINE PROCESS
Gabriella Rued TRANSFER - OUT REPORT:    Verbal report given to [facility] nurse on Meri Palumbo  being transferred to [facility] for routine progression of care       Report consisted of patients Situation, Background, Assessment and   Recommendations(SBAR). Information from the following report(s) SBAR was reviewed with the receiving nurse. Lines:   Peripheral IV 08/19/21 Left Antecubital (Active)   Site Assessment Clean, dry, & intact 08/19/21 0155   Phlebitis Assessment 0 08/19/21 0155   Infiltration Assessment 0 08/19/21 0155   Dressing Status Clean, dry, & intact 08/19/21 0155   Dressing Type Tape;Transparent 08/19/21 0155   Hub Color/Line Status Green 08/19/21 0155       Peripheral IV 08/19/21 Left Wrist (Active)   Site Assessment Clean, dry, & intact 08/19/21 0257   Phlebitis Assessment 0 08/19/21 0257   Infiltration Assessment 0 08/19/21 0257   Dressing Status Clean, dry, & intact 08/19/21 0257   Dressing Type Tape;Transparent 08/19/21 0257   Hub Color/Line Status Green 08/19/21 0257        Opportunity for questions and clarification was provided.       Patient transported with:   [employer]

## 2021-08-19 NOTE — PROGRESS NOTES
HOSPITALIST PROGRESS NOTE    Dana Longoria is a 80 y.o. female was admitted to hospital for Hypokalemia. Patients   H&P reviewed  Patient complains of some breath. Patient was found to have severe hypokalemia and hypomagnesemia. Patient was given IV supplementation. Patient is lethargic easily arousable but not very communicative. Visit Vitals  BP 91/61   Pulse 88   Temp 98.7 °F (37.1 °C)   Resp 20   Ht 5' 2\" (1.575 m)   Wt 86.2 kg (190 lb)   SpO2 93%   BMI 34.75 kg/m²     PHYSICAL EXAM:  General: Lethargic but easily arousable  Skin: Extremities and face reveal no rashes. No hwang erythema. No telangiectasias on the chest wall. HEENT: Sclerae anicteric. Extra-occular muscles are intact. No oral ulcers. No ENT discharge. The neck is supple. Cardiovascular: Regular rate and rhythm. No murmurs, gallops, or rubs. PMI nondisplaced. Carotids without bruits. Respiratory: Comfortable breathing with no accessory muscle use. Clear breath sounds with no wheezes, rales, or rhonchi. GI: Abdomen nondistended, soft, and nontender. Normal active bowel sounds. No enlargement of the liver or spleen. No masses palpable. Rectal: Deferred   Musculoskeletal: No pitting edema of the lower legs. Extremities have good range of motion. No costovertebral tenderness. Neurological: Gross memory appears intact. Lethargic but easily arousable  Psychiatric: Mood appears appropriate with judgement intact. Lymphatic: No cervical or supraclavicular adenopathy.     Recent Results (from the past 12 hour(s))   METABOLIC PANEL, BASIC    Collection Time: 08/19/21  8:22 AM   Result Value Ref Range    Sodium 129 (L) 136 - 145 mmol/L    Potassium 3.4 (L) 3.5 - 5.1 mmol/L    Chloride 92 (L) 97 - 108 mmol/L    CO2 30 21 - 32 mmol/L    Anion gap 7 5 - 15 mmol/L    Glucose 128 (H) 65 - 100 mg/dL    BUN 9 6 - 20 mg/dL    Creatinine 0.64 0.55 - 1.02 mg/dL    BUN/Creatinine ratio 14 12 - 20      GFR est AA >60 >60 ml/min/1.73m2    GFR est non-AA >60 >60 ml/min/1.73m2    Calcium 7.3 (L) 8.5 - 10.1 mg/dL   MAGNESIUM    Collection Time: 08/19/21  8:22 AM   Result Value Ref Range    Magnesium 8.3 (H) 1.6 - 2.4 mg/dL   MAGNESIUM    Collection Time: 08/19/21  3:00 PM   Result Value Ref Range    Magnesium 2.2 1.6 - 2.4 mg/dL   METABOLIC PANEL, BASIC    Collection Time: 08/19/21  3:00 PM   Result Value Ref Range    Sodium 131 (L) 136 - 145 mmol/L    Potassium 3.8 3.5 - 5.1 mmol/L    Chloride 95 (L) 97 - 108 mmol/L    CO2 31 21 - 32 mmol/L    Anion gap 5 5 - 15 mmol/L    Glucose 141 (H) 65 - 100 mg/dL    BUN 8 6 - 20 mg/dL    Creatinine 0.59 0.55 - 1.02 mg/dL    BUN/Creatinine ratio 14 12 - 20      GFR est AA >60 >60 ml/min/1.73m2    GFR est non-AA >60 >60 ml/min/1.73m2    Calcium 7.7 (L) 8.5 - 10.1 mg/dL     XR CHEST PORT   Final Result   Basilar opacities, nonspecific, but could represent pneumonia in the appropriate   clinical setting. Radiographic follow-up is recommended.             Current Facility-Administered Medications:     dilTIAZem ER (CARDIZEM CD) capsule 240 mg, 240 mg, Oral, DAILY, Olaf Edmonds MD, 240 mg at 08/19/21 1001    levothyroxine (SYNTHROID) tablet 125 mcg, 125 mcg, Oral, 6am, Olaf Edmonds MD, 125 mcg at 08/19/21 0851    DULoxetine (CYMBALTA) capsule 60 mg, 60 mg, Oral, DAILY, Olaf Edmonds MD, 60 mg at 08/19/21 0851    predniSONE (DELTASONE) tablet 2.5 mg, 2.5 mg, Oral, DAILY, Olaf Edmonds MD, 2.5 mg at 08/19/21 1001    traMADoL (ULTRAM) tablet 50 mg, 50 mg, Oral, Q8H PRN, Olaf Edmonds MD, 50 mg at 08/19/21 0538    propranoloL (INDERAL) tablet 10 mg, 10 mg, Oral, BID, Olaf Edmonds MD, 10 mg at 08/19/21 1001    sodium chloride (NS) flush 5-40 mL, 5-40 mL, IntraVENous, Q8H, Olaf Edmonds MD, 10 mL at 08/19/21 1338    sodium chloride (NS) flush 5-40 mL, 5-40 mL, IntraVENous, PRN, Olaf Edmonds MD    polyethylene glycol (MIRALAX) packet 17 g, 17 g, Oral, DAILY PRN, Olaf Edmonds MD    ondansetron (ZOFRAN ODT) tablet 4 mg, 4 mg, Oral, Q8H PRN **OR** ondansetron (ZOFRAN) injection 4 mg, 4 mg, IntraVENous, Q6H PRN, Olaf Edmonds MD    cefTRIAXone (ROCEPHIN) 1 g in sterile water (preservative free) 10 mL IV syringe, 1 g, IntraVENous, Q24H, Olaf Edmonds MD    doxycycline (VIBRAMYCIN) 100 mg in 0.9% sodium chloride (MBP/ADV) 100 mL MBP, 100 mg, IntraVENous, Q12H, Olaf Edmonds MD, Last Rate: 100 mL/hr at 08/19/21 1205, 100 mg at 08/19/21 1205    0.9% sodium chloride infusion, 75 mL/hr, IntraVENous, CONTINUOUS, Olaf Edmonds MD, Last Rate: 75 mL/hr at 08/19/21 0528, 75 mL/hr at 08/19/21 0528    acetaminophen (TYLENOL) tablet 650 mg, 650 mg, Oral, Q6H PRN, 325 mg at 08/19/21 1158 **OR** acetaminophen (TYLENOL) suppository 650 mg, 650 mg, Rectal, Q6H PRN, Olaf Edmonds MD    warfarin (COUMADIN) tablet 3 mg, 3 mg, Oral, ONCE, Olaf Edmonds MD    WARFARIN INFORMATION NOTE (COUMADIN), , Other, Rx Dosing/Monitoring, Hailey Davis MD      Plan. Hospital Problems  Never Reviewed        Codes Class Noted POA    * (Principal) Hypokalemia ICD-10-CM: E87.6  ICD-9-CM: 276.8  8/19/2021 Unknown        Hypomagnesemia ICD-10-CM: E83.42  ICD-9-CM: 275.2  8/19/2021 Unknown        Pneumonia ICD-10-CM: J18.9  ICD-9-CM: 815  8/19/2021 Unknown        Hyponatremia ICD-10-CM: E87.1  ICD-9-CM: 276.1  8/19/2021 Unknown        Abnormal EKG ICD-10-CM: R94.31  ICD-9-CM: 794.31  8/19/2021 Unknown            Repeat BMP  We will get cardiac enzymes on the patient  We will continue rest of the care plan as per admitting physician.       Dixie Coronado MD  08/19/21

## 2021-08-19 NOTE — PROGRESS NOTES
Reason for Admission:                       RUR Score:                     Plan for utilizing home health:          PCP: First and Last name:  Lennox Steele MD     Name of Practice:    Are you a current patient: Yes/No:    Approximate date of last visit:    Can you participate in a virtual visit with your PCP:                     Current Advanced Directive/Advance Care Plan: Full Code      Healthcare Decision Maker:   Click here to complete [address] including selection of the Healthcare Decision Maker Relationship (ie \"Primary\")                             Transition of Care Plan:                      CM met with patient by bedside. Patient was alert but coming out of a nap. Patient lives alone in an apartment with 3 steps to enter. Patient has a hx of HH but could not remember the agency. No DME at home. Patient is independent with ADL's. CM will continue to follow for dc needs.

## 2021-08-19 NOTE — H&P
GENERAL GENERIC H&P/CONSULT    Subjective:  59-year-old female with past medical history including atrial fibrillation on warfarin, hypothyroidism, essential tremor, on chronic steroid treatment for fibromyalgia. Patient presented to the emergency department complaining of shortness of breath since last night with associated productive cough. She has nasal congestion and some runny nose but denies fever or chills. Denies chest pain, palpitations or lower extremity edema. Denies wheezing. In the emergency department patient is afebrile, placed on 3 L of oxygen via nasal cannula for SPO2 of 88%. Pertinent blood work findings including leukocytosis, hypokalemia of 2.9, hypomagnesemia of 0.8. Normal troponin. Normal D-dimer when adjusted to age. EKG shows T wave inversion and subtle ST depression on V4 through V6. Chest x-ray shows bibasilar opacities suspicious for pneumonia. SARS-CoV-2 PCR is negative. Past Medical History:   Diagnosis Date    Atrial fibrillation ([address]     Fibromyalgia     Hypothyroid       Past Surgical History:   Procedure Laterality Date    HX BREAST BIOPSY Left     benign    HX HYSTERECTOMY      age 40      Prior to Admission medications    Medication Sig Start Date End Date Taking? Authorizing Provider   dilTIAZem ER [facility]) 120 mg capsule Take 240 mg by mouth. 2/8/21 2/3/22 Yes Provider, Historical   levothyroxine (SYNTHROID) 125 mcg tablet Take 125 mcg by mouth. 7/6/21   Provider, Historical   DULoxetine (CYMBALTA) 60 mg capsule Take 60 mg by mouth. 7/10/21   Provider, Historical   predniSONE (DELTASONE) 2.5 mg tablet Take 2.5 mg by mouth daily. 6/3/21   Provider, Historical   warfarin (COUMADIN) 1 mg tablet  7/10/21   Provider, Historical   traMADoL (ULTRAM) 50 mg tablet Take 50 mg by mouth every eight (8) hours as needed. 7/9/21   Provider, Historical   propranoloL (INDERAL) 10 mg tablet Take 10 mg by mouth two (2) times a day.  7/12/21   Provider, Historical   ALPRAZolam [facility]ke[facility] Allyentrelizabeth) 0.25 mg tablet Take 0.25 mg by mouth. 8/18/21   Provider, Historical     No Known Allergies   Social History     Tobacco Use    Smoking status: Not on file   Substance Use Topics    Alcohol use: Not on file      Family History   Problem Relation Age of Onset    Breast Cancer Sister     Cancer Brother     Kidney Disease Other       Review of Systems   Constitutional: Negative for appetite change, chills, diaphoresis and fever. HENT: Negative. Eyes: Negative for pain and visual disturbance. Respiratory: Positive for cough and shortness of breath. Negative for wheezing. Cardiovascular: Negative. Gastrointestinal: Negative. Endocrine: Negative. Genitourinary: Negative. Musculoskeletal: Negative. Skin: Negative. Neurological: Negative. Objective:    08/18 1901 - 08/19 0700  In: 50 [I.V.:50]  Out: 700 [Urine:700]  No intake/output data recorded. Patient Vitals for the past 8 hrs:   BP Temp Pulse Resp SpO2 Height Weight   08/19/21 0303 115/65 98.7 °F (37.1 °C) 83 24 96 % -- --   08/19/21 0158 (!) 111/58 98.9 °F (37.2 °C) 80 21 95 % -- --   08/19/21 0104 107/74 -- 84 24 93 % -- --   08/19/21 0001 124/62 98.5 °F (36.9 °C) 82 22 92 % 5' 2\" (1.575 m) 86.2 kg (190 lb)     Physical Exam  Constitutional:       General: She is not in acute distress. Appearance: Normal appearance. Comments: Elderly female, nontoxic appearing. Not in acute respiratory distress   HENT:      Head: Normocephalic and atraumatic. Mouth/Throat:      Mouth: Mucous membranes are moist.      Pharynx: Oropharynx is clear. Eyes:      Conjunctiva/sclera: Conjunctivae normal.      Pupils: Pupils are equal, round, and reactive to light. Cardiovascular:      Rate and Rhythm: Normal rate. Pulses: Normal pulses. Heart sounds: Normal heart sounds. No murmur heard. No friction rub. No gallop. Pulmonary:      Effort: Pulmonary effort is normal.      Breath sounds: Normal breath sounds. No wheezing or rales. Abdominal:      General: Bowel sounds are normal.      Palpations: Abdomen is soft. Musculoskeletal:         General: Normal range of motion. Cervical back: Neck supple. Neurological:      General: No focal deficit present. Mental Status: She is alert. Mental status is at baseline. Cranial Nerves: No cranial nerve deficit. Motor: No weakness. Labs:    Recent Results (from the past 24 hour(s))   METABOLIC PANEL, BASIC    Collection Time: 08/19/21 12:52 AM   Result Value Ref Range    Sodium 125 (L) 136 - 145 mmol/L    Potassium 2.9 (L) 3.5 - 5.1 mmol/L    Chloride 86 (L) 97 - 108 mmol/L    CO2 30 21 - 32 mmol/L    Anion gap 9 5 - 15 mmol/L    Glucose 119 (H) 65 - 100 mg/dL    BUN 13 6 - 20 mg/dL    Creatinine 0.60 0.55 - 1.02 mg/dL    BUN/Creatinine ratio 22 (H) 12 - 20      GFR est AA >60 >60 ml/min/1.73m2    GFR est non-AA >60 >60 ml/min/1.73m2    Calcium 7.4 (L) 8.5 - 10.1 mg/dL   CBC WITH AUTOMATED DIFF    Collection Time: 08/19/21 12:52 AM   Result Value Ref Range    WBC 19.7 (H) 3.6 - 11.0 K/uL    RBC 3.63 (L) 3.80 - 5.20 M/uL    HGB 11.2 (L) 11.5 - 16.0 g/dL    HCT 32.5 (L) 35.0 - 47.0 %    MCV 89.5 80.0 - 99.0 FL    MCH 30.9 26.0 - 34.0 PG    MCHC 34.5 30.0 - 36.5 g/dL    RDW 12.2 11.5 - 14.5 %    PLATELET 907 270 - 559 K/uL    MPV 9.9 8.9 - 12.9 FL    NRBC 0.0 0.0  WBC    ABSOLUTE NRBC 0.00 0.00 - 0.01 K/uL    NEUTROPHILS 77 (H) 32 - 75 %    LYMPHOCYTES 15 12 - 49 %    MONOCYTES 7 5 - 13 %    EOSINOPHILS 0 0 - 7 %    BASOPHILS 0 0 - 1 %    IMMATURE GRANULOCYTES 1 (H) 0 - 0.5 %    ABS. NEUTROPHILS 15.2 (H) 1.8 - 8.0 K/UL    ABS. LYMPHOCYTES 2.9 0.8 - 3.5 K/UL    ABS. MONOCYTES 1.4 (H) 0.0 - 1.0 K/UL    ABS. EOSINOPHILS 0.1 0.0 - 0.4 K/UL    ABS. BASOPHILS 0.1 0.0 - 0.1 K/UL    ABS. IMM.  GRANS. 0.1 (H) 0.00 - 0.04 K/UL    DF AUTOMATED     TROPONIN I    Collection Time: 08/19/21 12:52 AM   Result Value Ref Range    Troponin-I, Qt. <0.05 <0.05 ng/mL MAGNESIUM    Collection Time: 08/19/21 12:52 AM   Result Value Ref Range    Magnesium 0.8 (LL) 1.6 - 2.4 mg/dL   BNP    Collection Time: 08/19/21 12:52 AM   Result Value Ref Range    NT pro-BNP 1,874 (H) <450 pg/mL   SARS-COV-2    Collection Time: 08/19/21  1:50 AM   Result Value Ref Range    SARS-CoV-2 Please find results under separate order     COVID-19 RAPID TEST    Collection Time: 08/19/21  2:27 AM   Result Value Ref Range    Specimen source Nasopharyngeal      COVID-19 rapid test Not Detected Not Detected     URINALYSIS W/ REFLEX CULTURE    Collection Time: 08/19/21  2:45 AM    Specimen: Urine   Result Value Ref Range    Color Yellow/Straw      Appearance Clear Clear      Specific gravity <1.005 1.003 - 1.030    pH (UA) 6.0 5.0 - 8.0      Protein Negative Negative mg/dL    Glucose Negative Negative mg/dL    Ketone Negative Negative mg/dL    Bilirubin Negative Negative      Blood Negative Negative      Urobilinogen 0.1 0.1 - 1.0 EU/dL    Nitrites Negative Negative      Leukocyte Esterase Negative Negative      UA:UC IF INDICATED PENDING     WBC 0-4 0 - 4 /hpf    RBC 0-5 0 - 5 /hpf    Bacteria Negative Negative /hpf   PROTHROMBIN TIME + INR    Collection Time: 08/19/21  3:20 AM   Result Value Ref Range    Prothrombin time 28.7 (H) 11.9 - 14.7 sec    INR 2.7 (H) 0.9 - 1.1     PTT    Collection Time: 08/19/21  3:20 AM   Result Value Ref Range    aPTT 65.5 (H) 21.2 - 34.1 sec    aPTT, therapeutic range   82 - 109 sec   D DIMER    Collection Time: 08/19/21  3:20 AM   Result Value Ref Range    D DIMER 0.72 (H) <0.50 ug/ml(FEU)         Assessment:  Active Problems:    Hypokalemia (8/19/2021)      Hypomagnesemia (8/19/2021)      Pneumonia (8/19/2021)    Likely bibasilar pneumonia  -Shortness of breath, cough, leukocytosis, bibasilar opacities  -Empiric antibiotic coverage with ceftriaxone, doxycycline  -Respiratory culture, urine antigens  -Supportive oxygen as needed    Hypomagnesemia  -Serum magnesium of 0.8  -IV magnesium sulfate    Hypokalemia  -IV and p.o. KCl  -Follow-up BMP    Hyponatremia  -Anticipate improvement with volume expansion  -Gentle IV NS     Abnormal EKG  -T wave inversion and ST depression, lateral leads  -No chest pain, normal serial troponin  -Anticoagulated on warfarin  -Consider stress test once clinically stable    Urinary retention  -Gray placed in the ER  -Voiding trial    Atrial fibrillation  -Diltiazem  -Warfarin. Therapeutic monitoring with pharmacy consult    Essential tremor  -Propranolol    Hypothyroidism  -Synthroid    Chronic steroid therapy  -On prednisone for fibromyalgia    DVT prophylaxis:  On warfarin    Full code      Signed:  Jose Luis Castillo MD 8/19/2021

## 2021-08-19 NOTE — ED NOTES
Bedside and Verbal shift change report given to Eladio Green RN (oncoming nurse) by Esthela Brown RN (offgoing nurse). Report included the following information SBAR & care transferred.

## 2021-08-19 NOTE — ED NOTES
Called lab to check status of results for D-dimer, PTT, PTINR, & covid test. Lab states that they need new blue top tube to run ordered labs. ED MD Black informed.

## 2021-08-19 NOTE — ED TRIAGE NOTES
Transported via EMS from home endorsing SOB that began approx 23:00. Pt on rm air at baseline.  EMS states SpO2 88% on rm air upon their arrival.

## 2021-08-19 NOTE — PROGRESS NOTES
Pharmacist Note - Warfarin Dosing  Consult provided for this 80 y. o.female to manage warfarin for Atrial Fibrillation    INR Goal: 2 - 3    Home regimen/ tablet size: 3 mg daily    Drugs that may increase INR: Azithromycin x 1  Drugs that may decrease INR: None  Other current anticoagulants/ drugs that may increase bleeding risk: None  Risk factors: Age > 65  Daily INR ordered: YES    Recent Labs     08/19/21  0320 08/19/21  0052   HGB  --  11.2*   INR 2.7*  --                                                                                   Assessment/ Plan:  INR in goal range. Will order warfarin 3 mg PO x 1 dose. Pharmacy will continue to monitor daily and adjust therapy as indicated.

## 2021-08-19 NOTE — ED PROVIDER NOTES
EMERGENCY DEPARTMENT HISTORY AND PHYSICAL EXAM      Date: 8/18/2021  Patient Name: Quincy Pablo      History of Presenting Illness     Chief Complaint   Patient presents with    Shortness of Breath       History Provided By: Patient and EMS    HPI: Quincy Pablo, 80 y.o. female with PMH of A. fib on rate control with beta-blocker and AC with warfarin, history of hypothyroidism who presents to the emergency department with shortness of breath. Patient reports that she went to [place] to visit the family member and when she came she started experiencing worsening shortness of breath. Earlier PTA, patient report that her shortness of breath was worsening and she called EMS. Patient is not on home oxygen. EMS report the patient was 88% room air in which she was started on oxygen. Patient reports that she feels short of breath even if she is not moving. But she does feel improved with oxygen. She is reporting new onset of cough for the last 2 days. She is denying chest pain, lower extremity swelling or pain. She denied missing any doses of her anticoagulation. Denies any prior history of CHF, coronary artery disease, or hypertension. Additionally, patient denied fever, chills, runny nose, or nasal congestion. Patient is unsure if she was exposed to sick contacts with COVID-19. There are no other complaints, changes, or physical findings at this time.     PCP: Denita Mulligan MD    Current Facility-Administered Medications   Medication Dose Route Frequency Provider Last Rate Last Admin    aspirin chewable tablet 324 mg  324 mg Oral NOW Stephanie Black MD        cefTRIAXone (ROCEPHIN) 1 g in sterile water (preservative free) 10 mL IV syringe  1 g IntraVENous ONCE Stephanie Black MD        azithromycin (ZITHROMAX) 500 mg in 0.9% sodium chloride 250 mL (VIAL-MATE)  500 mg IntraVENous ONCE Stephanie Black MD        magnesium sulfate 3 g in 0.9% sodium chloride 100 mL IVPB  3 g IntraVENous ONCE Jd Edmonds MD        potassium chloride 10 mEq in 100 ml IVPB  10 mEq IntraVENous Q1H Olaf Edmonds MD        potassium chloride (K-DUR, KLOR-CON) SR tablet 40 mEq  40 mEq Oral NOW Olaf Edmonds MD        dilTIAZem ER (DILACOR XR) capsule 240 mg  240 mg Oral DAILY Olaf Edmonds MD        levothyroxine (SYNTHROID) tablet 125 mcg  125 mcg Oral 6am Olaf Edmonds MD        DULoxetine (CYMBALTA) capsule 60 mg  60 mg Oral DAILY Olaf Edmonds MD        predniSONE (DELTASONE) tablet 2.5 mg  2.5 mg Oral DAILY Olaf Edmonds MD        traMADoL (ULTRAM) tablet 50 mg  50 mg Oral Q8H PRN lOaf Edmonds MD        propranoloL (INDERAL) tablet 10 mg  10 mg Oral BID Olaf Edmonds MD        sodium chloride (NS) flush 5-40 mL  5-40 mL IntraVENous Q8H Olaf Edmonds MD        sodium chloride (NS) flush 5-40 mL  5-40 mL IntraVENous PRN Olaf Edmonds MD        acetaminophen (TYLENOL) tablet 650 mg  650 mg Oral Q6H PRN Olaf Edmonds MD        Or    acetaminophen (TYLENOL) suppository 660 mg  660 mg Rectal Q6H PRN Olaf Edmonds MD        polyethylene glycol (MIRALAX) packet 17 g  17 g Oral DAILY PRN Olaf Edmonds MD        ondansetron (ZOFRAN ODT) tablet 4 mg  4 mg Oral Q8H PRN Olaf Edmonds MD        Or    ondansetron (ZOFRAN) injection 4 mg  4 mg IntraVENous Q6H PRN Olaf Edmonds MD        cefTRIAXone (ROCEPHIN) 1 g in sterile water (preservative free) 10 mL IV syringe  1 g IntraVENous Q24H Olaf Edmonds MD        doxycycline (VIBRAMYCIN) 100 mg in 0.9% sodium chloride (MBP/ADV) 100 mL MBP  100 mg IntraVENous Q12H Olaf Edmonds MD         Current Outpatient Medications   Medication Sig Dispense Refill    dilTIAZem ER (TIAZAC) 120 mg capsule Take 240 mg by mouth.  levothyroxine (SYNTHROID) 125 mcg tablet Take 125 mcg by mouth.  DULoxetine (CYMBALTA) 60 mg capsule Take 60 mg by mouth.  predniSONE (DELTASONE) 2.5 mg tablet Take 2.5 mg by mouth daily.       warfarin (COUMADIN) 1 mg tablet       traMADoL (ULTRAM) 50 mg tablet Take 50 mg by mouth every eight (8) hours as needed.  propranoloL (INDERAL) 10 mg tablet Take 10 mg by mouth two (2) times a day.  ALPRAZolam (XANAX) 0.25 mg tablet Take 0.25 mg by mouth. Past History     Past Medical History:  Past Medical History:   Diagnosis Date    Atrial fibrillation ([address]     Fibromyalgia     Hypothyroid        Past Surgical History:  Past Surgical History:   Procedure Laterality Date    HX BREAST BIOPSY Left     benign    HX HYSTERECTOMY      age 40       Family History:  Family History   Problem Relation Age of Onset    Breast Cancer Sister     Cancer Brother     Kidney Disease Other        Social History:  Social History     Tobacco Use    Smoking status: Not on file   Substance Use Topics    Alcohol use: Not on file    Drug use: Not on file       Allergies:  No Known Allergies      Review of Systems     Review of Systems   Constitutional: Positive for fatigue. Negative for activity change, chills and fever. HENT: Negative for congestion and facial swelling. Eyes: Negative for discharge and visual disturbance. Respiratory: Positive for cough and shortness of breath. Negative for chest tightness. Cardiovascular: Negative for chest pain and leg swelling. Gastrointestinal: Negative for abdominal pain, diarrhea and vomiting. Genitourinary: Negative for dysuria and flank pain. Musculoskeletal: Negative for back pain and gait problem. Skin: Negative for rash and wound. Neurological: Negative for dizziness, weakness and headaches. Physical Exam     Physical Exam  Constitutional:       General: She is not in acute distress. Appearance: Normal appearance. She is obese. Comments: On O2 through nasal cannula   HENT:      Head: Normocephalic and atraumatic. Mouth/Throat:      Mouth: Mucous membranes are moist.      Pharynx: Oropharynx is clear.    Eyes:      Extraocular Movements: Extraocular movements intact. Conjunctiva/sclera: Conjunctivae normal.      Pupils: Pupils are equal, round, and reactive to light. Cardiovascular:      Rate and Rhythm: Normal rate and regular rhythm. Pulmonary:      Effort: Pulmonary effort is normal. Tachypnea present. Breath sounds: Examination of the right-lower field reveals rhonchi. Examination of the left-lower field reveals rhonchi. Rhonchi present. Abdominal:      General: Abdomen is flat. Palpations: Abdomen is soft. Tenderness: There is no abdominal tenderness. Musculoskeletal:         General: No tenderness or deformity. Cervical back: Normal range of motion and neck supple. Skin:     General: Skin is warm and dry. Neurological:      General: No focal deficit present. Mental Status: She is alert and oriented to person, place, and time.          Lab and Diagnostic Study Results     Labs -     Recent Results (from the past 12 hour(s))   METABOLIC PANEL, BASIC    Collection Time: 08/19/21 12:52 AM   Result Value Ref Range    Sodium 125 (L) 136 - 145 mmol/L    Potassium 2.9 (L) 3.5 - 5.1 mmol/L    Chloride 86 (L) 97 - 108 mmol/L    CO2 30 21 - 32 mmol/L    Anion gap 9 5 - 15 mmol/L    Glucose 119 (H) 65 - 100 mg/dL    BUN 13 6 - 20 mg/dL    Creatinine 0.60 0.55 - 1.02 mg/dL    BUN/Creatinine ratio 22 (H) 12 - 20      GFR est AA >60 >60 ml/min/1.73m2    GFR est non-AA >60 >60 ml/min/1.73m2    Calcium 7.4 (L) 8.5 - 10.1 mg/dL   CBC WITH AUTOMATED DIFF    Collection Time: 08/19/21 12:52 AM   Result Value Ref Range    WBC 19.7 (H) 3.6 - 11.0 K/uL    RBC 3.63 (L) 3.80 - 5.20 M/uL    HGB 11.2 (L) 11.5 - 16.0 g/dL    HCT 32.5 (L) 35.0 - 47.0 %    MCV 89.5 80.0 - 99.0 FL    MCH 30.9 26.0 - 34.0 PG    MCHC 34.5 30.0 - 36.5 g/dL    RDW 12.2 11.5 - 14.5 %    PLATELET 115 557 - 930 K/uL    MPV 9.9 8.9 - 12.9 FL    NRBC 0.0 0.0  WBC    ABSOLUTE NRBC 0.00 0.00 - 0.01 K/uL    NEUTROPHILS 77 (H) 32 - 75 %    LYMPHOCYTES 15 12 - 49 %    MONOCYTES 7 5 - 13 %    EOSINOPHILS 0 0 - 7 %    BASOPHILS 0 0 - 1 %    IMMATURE GRANULOCYTES 1 (H) 0 - 0.5 %    ABS. NEUTROPHILS 15.2 (H) 1.8 - 8.0 K/UL    ABS. LYMPHOCYTES 2.9 0.8 - 3.5 K/UL    ABS. MONOCYTES 1.4 (H) 0.0 - 1.0 K/UL    ABS. EOSINOPHILS 0.1 0.0 - 0.4 K/UL    ABS. BASOPHILS 0.1 0.0 - 0.1 K/UL    ABS. IMM.  GRANS. 0.1 (H) 0.00 - 0.04 K/UL    DF AUTOMATED     TROPONIN I    Collection Time: 08/19/21 12:52 AM   Result Value Ref Range    Troponin-I, Qt. <0.05 <0.05 ng/mL   MAGNESIUM    Collection Time: 08/19/21 12:52 AM   Result Value Ref Range    Magnesium 0.8 (LL) 1.6 - 2.4 mg/dL   BNP    Collection Time: 08/19/21 12:52 AM   Result Value Ref Range    NT pro-BNP 1,874 (H) <450 pg/mL   SARS-COV-2    Collection Time: 08/19/21  1:50 AM   Result Value Ref Range    SARS-CoV-2 Please find results under separate order     COVID-19 RAPID TEST    Collection Time: 08/19/21  2:27 AM   Result Value Ref Range    Specimen source Nasopharyngeal      COVID-19 rapid test Not Detected Not Detected     URINALYSIS W/ REFLEX CULTURE    Collection Time: 08/19/21  2:45 AM    Specimen: Urine   Result Value Ref Range    Color Yellow/Straw      Appearance Clear Clear      Specific gravity <1.005 1.003 - 1.030    pH (UA) 6.0 5.0 - 8.0      Protein Negative Negative mg/dL    Glucose Negative Negative mg/dL    Ketone Negative Negative mg/dL    Bilirubin Negative Negative      Blood Negative Negative      Urobilinogen 0.1 0.1 - 1.0 EU/dL    Nitrites Negative Negative      Leukocyte Esterase Negative Negative      UA:UC IF INDICATED PENDING     WBC 0-4 0 - 4 /hpf    RBC 0-5 0 - 5 /hpf    Bacteria Negative Negative /hpf   PROTHROMBIN TIME + INR    Collection Time: 08/19/21  3:20 AM   Result Value Ref Range    Prothrombin time 28.7 (H) 11.9 - 14.7 sec    INR 2.7 (H) 0.9 - 1.1     PTT    Collection Time: 08/19/21  3:20 AM   Result Value Ref Range    aPTT 65.5 (H) 21.2 - 34.1 sec    aPTT, therapeutic range   82 - 109 sec   D DIMER    Collection Time: 08/19/21  3:20 AM   Result Value Ref Range    D DIMER 0.72 (H) <0.50 ug/ml(FEU)       Radiologic Studies -   [unfilled]  CT Results  (Last 48 hours)    None        CXR Results  (Last 48 hours)               08/19/21 0057  XR CHEST PORT Final result    Impression:  Basilar opacities, nonspecific, but could represent pneumonia in the appropriate   clinical setting. Radiographic follow-up is recommended. Narrative:  Examination: XR CHEST PORT        History: SOB       Comparison: None available. FINDINGS:       Single frontal portable view of the chest. Basilar opacities, right greater than   left. No pneumothorax or significant pleural effusion. Cardiac silhouette is   normal in size. Atherosclerosis of the thoracic aorta. No acute or aggressive   osseous abnormalities are evident. Medical Decision Making and ED Course   - I am the first and primary provider for this patient AND AM THE PRIMARY PROVIDER OF RECORD. - I reviewed the vital signs, available nursing notes, past medical history, past surgical history, family history and social history. - Initial assessment performed. The patients presenting problems have been discussed, and the staff are in agreement with the care plan formulated and outlined with them. I have encouraged them to ask questions as they arise throughout their visit. Vital Signs-Reviewed the patient's vital signs.     Patient Vitals for the past 12 hrs:   Temp Pulse Resp BP SpO2   08/19/21 0453 -- -- -- -- 96 %   08/19/21 0452 -- 89 26 (!) 134/57 95 %   08/19/21 0303 98.7 °F (37.1 °C) 83 24 115/65 96 %   08/19/21 0158 98.9 °F (37.2 °C) 80 21 (!) 111/58 95 %   08/19/21 0104 -- 84 24 107/74 93 %   08/19/21 0001 98.5 °F (36.9 °C) 82 22 124/62 92 %         Records Reviewed: Nursing Notes    Provider Notes (Medical Decision Making):   77-year-old female with past medical history of atrial fibrillation hypothyroidism presented emergency department with shortness of breath and coughing in the setting of recent travel. Concern is for infectious etiology versus PE. Additionally, ACS and CHF on the differentials. I will obtain CBC, chemistry, troponin, proBNP, D-dimer, coagulation following the chest x-ray. Additionally, we will send a Covid swab. ED Course:       ED Course as of Aug 19 0503   Thu Aug 19, 2021   0015 EKG: Atrial fibrillation, rate 82, LVH, QRS within normal, QTC within normal, T wave inversions in the inferior, anterior, and lateral leads. There is no old EKG for comparison. Patient does have ST depression in leads II, III, and aVF    [AA]   0130 Repeat EKG at 1230 and 1:30 AM showed no evolution of symptoms. [AA]      ED Course User Index  [AA] Ovidio Ordoñez MD     Patient work-up has been completed. Her CBC showed leukocytosis, chemistry showed electrolyte derangement with hyponatremia, hypokalemia, and profound hypomagnesemia. Intravenous repletion started in the ED. Patient does have elevation of her proBNP which is probably from her over all sickness. She did have ST depression in the inferior leads. However, troponin is unremarkable. Repeat EKG did not show any evolution of her symptoms. She did remain hemodynamically stable. Chest x-ray showed pneumonia. The overall picture is more consistent with infectious etiology with pneumonia. She did have COVID-19 swab which was negative. I will admit the patient for IV antibiotics and further work-up. Disposition     Disposition: Condition improved  Admitted to Floor Medical Floor the case was discussed with the admitting physician Dr. Cesar Mendez    Admitted      Diagnosis     Clinical Impression:   1. Community acquired pneumonia, unspecified laterality    2. Hypokalemia    3. Hypomagnesemia    4. Leukocytosis, unspecified type    5. Elevated brain natriuretic peptide (BNP) level        Attestations:     Reginald Clifford MD    Please note that this dictation was completed with [employer], the [employer] voice recognition software. Quite often unanticipated grammatical, syntax, homophones, and other interpretive errors are inadvertently transcribed by the computer software. Please disregard these errors. Please excuse any errors that have escaped final proofreading. Thank you.

## 2021-08-19 NOTE — ED NOTES
Called lab to confirm receipt of additionally placed lab orders for D-dimer, PTT, PTINR & that they have blue top tube sent w/ initial IV start/lab collect specimen to run.

## 2021-08-20 LAB
ANION GAP SERPL CALC-SCNC: 5 MMOL/L (ref 5–15)
ATRIAL RATE: 234 BPM
ATRIAL RATE: 60 BPM
ATRIAL RATE: 80 BPM
BUN SERPL-MCNC: 8 MG/DL (ref 6–20)
BUN/CREAT SERPL: 16 (ref 12–20)
CA-I BLD-MCNC: 7.7 MG/DL (ref 8.5–10.1)
CALCULATED R AXIS, ECG10: 79 DEGREES
CALCULATED R AXIS, ECG10: 81 DEGREES
CALCULATED R AXIS, ECG10: 82 DEGREES
CALCULATED T AXIS, ECG11: -108 DEGREES
CALCULATED T AXIS, ECG11: -111 DEGREES
CALCULATED T AXIS, ECG11: -112 DEGREES
CHLORIDE SERPL-SCNC: 99 MMOL/L (ref 97–108)
CO2 SERPL-SCNC: 29 MMOL/L (ref 21–32)
CREAT SERPL-MCNC: 0.49 MG/DL (ref 0.55–1.02)
DIAGNOSIS, 93000: NORMAL
ERYTHROCYTE [DISTWIDTH] IN BLOOD BY AUTOMATED COUNT: 12.5 % (ref 11.5–14.5)
GLUCOSE SERPL-MCNC: 107 MG/DL (ref 65–100)
HCT VFR BLD AUTO: 31.1 % (ref 35–47)
HGB BLD-MCNC: 10.3 G/DL (ref 11.5–16)
INR PPP: 2.4 (ref 0.9–1.1)
MCH RBC QN AUTO: 30.7 PG (ref 26–34)
MCHC RBC AUTO-ENTMCNC: 33.1 G/DL (ref 30–36.5)
MCV RBC AUTO: 92.6 FL (ref 80–99)
NRBC # BLD: 0 K/UL (ref 0–0.01)
NRBC BLD-RTO: 0 PER 100 WBC
PLATELET # BLD AUTO: 252 K/UL (ref 150–400)
PMV BLD AUTO: 10.8 FL (ref 8.9–12.9)
POTASSIUM SERPL-SCNC: 3.5 MMOL/L (ref 3.5–5.1)
PROTHROMBIN TIME: 26 SEC (ref 11.9–14.7)
Q-T INTERVAL, ECG07: 398 MS
Q-T INTERVAL, ECG07: 406 MS
Q-T INTERVAL, ECG07: 416 MS
QRS DURATION, ECG06: 84 MS
QRS DURATION, ECG06: 86 MS
QRS DURATION, ECG06: 90 MS
QTC CALCULATION (BEZET), ECG08: 464 MS
QTC CALCULATION (BEZET), ECG08: 468 MS
QTC CALCULATION (BEZET), ECG08: 470 MS
RBC # BLD AUTO: 3.36 M/UL (ref 3.8–5.2)
SODIUM SERPL-SCNC: 133 MMOL/L (ref 136–145)
TROPONIN I SERPL-MCNC: <0.05 NG/ML
TROPONIN I SERPL-MCNC: <0.05 NG/ML
VENTRICULAR RATE, ECG03: 77 BPM
VENTRICULAR RATE, ECG03: 80 BPM
VENTRICULAR RATE, ECG03: 82 BPM
WBC # BLD AUTO: 11.4 K/UL (ref 3.6–11)

## 2021-08-20 PROCEDURE — 77010033678 HC OXYGEN DAILY

## 2021-08-20 PROCEDURE — 85027 COMPLETE CBC AUTOMATED: CPT

## 2021-08-20 PROCEDURE — 74011250636 HC RX REV CODE- 250/636: Performed by: INTERNAL MEDICINE

## 2021-08-20 PROCEDURE — 65270000029 HC RM PRIVATE

## 2021-08-20 PROCEDURE — 94760 N-INVAS EAR/PLS OXIMETRY 1: CPT

## 2021-08-20 PROCEDURE — 74011000258 HC RX REV CODE- 258: Performed by: INTERNAL MEDICINE

## 2021-08-20 PROCEDURE — 84484 ASSAY OF TROPONIN QUANT: CPT

## 2021-08-20 PROCEDURE — 80048 BASIC METABOLIC PNL TOTAL CA: CPT

## 2021-08-20 PROCEDURE — 74011636637 HC RX REV CODE- 636/637: Performed by: INTERNAL MEDICINE

## 2021-08-20 PROCEDURE — 74011250637 HC RX REV CODE- 250/637: Performed by: INTERNAL MEDICINE

## 2021-08-20 PROCEDURE — 85610 PROTHROMBIN TIME: CPT

## 2021-08-20 PROCEDURE — 97530 THERAPEUTIC ACTIVITIES: CPT

## 2021-08-20 PROCEDURE — 74011000250 HC RX REV CODE- 250: Performed by: INTERNAL MEDICINE

## 2021-08-20 PROCEDURE — 36415 COLL VENOUS BLD VENIPUNCTURE: CPT

## 2021-08-20 PROCEDURE — 97161 PT EVAL LOW COMPLEX 20 MIN: CPT

## 2021-08-20 RX ORDER — MORPHINE SULFATE 2 MG/ML
4 INJECTION, SOLUTION INTRAMUSCULAR; INTRAVENOUS ONCE
Status: DISCONTINUED | OUTPATIENT
Start: 2021-08-20 | End: 2021-08-20 | Stop reason: CLARIF

## 2021-08-20 RX ORDER — WARFARIN 3 MG/1
3 TABLET ORAL ONCE
Status: COMPLETED | OUTPATIENT
Start: 2021-08-20 | End: 2021-08-20

## 2021-08-20 RX ADMIN — WARFARIN SODIUM 3 MG: 3 TABLET ORAL at 18:44

## 2021-08-20 RX ADMIN — ACETAMINOPHEN 650 MG: 325 TABLET ORAL at 21:48

## 2021-08-20 RX ADMIN — Medication 10 ML: at 14:00

## 2021-08-20 RX ADMIN — LEVOTHYROXINE SODIUM 125 MCG: 0.03 TABLET ORAL at 06:44

## 2021-08-20 RX ADMIN — PREDNISONE 2.5 MG: 5 TABLET ORAL at 10:10

## 2021-08-20 RX ADMIN — DOXYCYCLINE 100 MG: 100 INJECTION, POWDER, LYOPHILIZED, FOR SOLUTION INTRAVENOUS at 10:15

## 2021-08-20 RX ADMIN — PROPRANOLOL HYDROCHLORIDE 10 MG: 20 TABLET ORAL at 10:10

## 2021-08-20 RX ADMIN — DILTIAZEM HYDROCHLORIDE 240 MG: 120 CAPSULE, COATED, EXTENDED RELEASE ORAL at 10:10

## 2021-08-20 RX ADMIN — Medication 10 ML: at 06:46

## 2021-08-20 RX ADMIN — DOXYCYCLINE 100 MG: 100 INJECTION, POWDER, LYOPHILIZED, FOR SOLUTION INTRAVENOUS at 21:49

## 2021-08-20 RX ADMIN — Medication 10 ML: at 21:50

## 2021-08-20 RX ADMIN — DULOXETINE HYDROCHLORIDE 60 MG: 30 CAPSULE, DELAYED RELEASE ORAL at 10:10

## 2021-08-20 RX ADMIN — CEFTRIAXONE 1 G: 1 INJECTION, POWDER, FOR SOLUTION INTRAMUSCULAR; INTRAVENOUS at 21:50

## 2021-08-20 NOTE — PROGRESS NOTES
Hospitalist Progress Note    Subjective:   Daily Progress Note: 8/20/2021 10:02 AM    Hospital Course:     Patient is 79-year-old female with a PMH significant for atrial fibrillation on Coumadin, hypothyroidism, essential tremor, fibromyalgia/steroid-dependent. She comes in to the emergency room with shortness of breath with associated productive cough, nasal congestion, rhinorrhea and weakness. On examination she is hypoxic 88% on room air placed on 3 L nasal cannula. X-ray reveals bibasilar opacities suspicious for pneumonia. COVID-19 PCR negative. Significant labs leukocytosis, hypokalemia of 2.9 and hypomagnesium of 0.8. Admitted for further management of community-acquired pneumonia, hypokalemia hypomagnesium. Having urinary retention Gray catheter was placed. Started on IV mag and potassium replacement. IV ceftriaxone and doxycycline initiated. Subjective:  Examined patient at the bedside. She is hoarse with productive cough. Appears weak and frail.     Current Facility-Administered Medications   Medication Dose Route Frequency    dilTIAZem ER (CARDIZEM CD) capsule 240 mg  240 mg Oral DAILY    levothyroxine (SYNTHROID) tablet 125 mcg  125 mcg Oral 6am    DULoxetine (CYMBALTA) capsule 60 mg  60 mg Oral DAILY    predniSONE (DELTASONE) tablet 2.5 mg  2.5 mg Oral DAILY    traMADoL (ULTRAM) tablet 50 mg  50 mg Oral Q8H PRN    propranoloL (INDERAL) tablet 10 mg  10 mg Oral BID    sodium chloride (NS) flush 5-40 mL  5-40 mL IntraVENous Q8H    sodium chloride (NS) flush 5-40 mL  5-40 mL IntraVENous PRN    polyethylene glycol (MIRALAX) packet 17 g  17 g Oral DAILY PRN    ondansetron (ZOFRAN ODT) tablet 4 mg  4 mg Oral Q8H PRN    Or    ondansetron (ZOFRAN) injection 4 mg  4 mg IntraVENous Q6H PRN    cefTRIAXone (ROCEPHIN) 1 g in sterile water (preservative free) 10 mL IV syringe  1 g IntraVENous Q24H    doxycycline (VIBRAMYCIN) 100 mg in 0.9% sodium chloride (MBP/ADV) 100 mL MBP  100 mg IntraVENous Q12H    acetaminophen (TYLENOL) suppository 650 mg  650 mg Rectal Q6H PRN    Or    acetaminophen (TYLENOL) tablet 650 mg  650 mg Oral Q6H PRN    WARFARIN INFORMATION NOTE (COUMADIN)   Other Rx Dosing/Monitoring    0.9% sodium chloride infusion  75 mL/hr IntraVENous CONTINUOUS        REVIEW OF SYSTEMS    Review of Systems   Constitutional: Positive for malaise/fatigue. HENT: Positive for congestion and sore throat. Respiratory: Positive for cough, sputum production, shortness of breath and wheezing. Cardiovascular: Negative for chest pain and palpitations. Gastrointestinal: Negative. Musculoskeletal: Positive for myalgias. Neurological: Positive for weakness. Objective:     Visit Vitals  /68   Pulse 60   Temp 98.1 °F (36.7 °C)   Resp 20   Ht 5' 2\" (1.575 m)   Wt 86.2 kg (190 lb)   SpO2 98%   BMI 34.75 kg/m²    O2 Flow Rate (L/min): 2.5 l/min O2 Device: Nasal cannula    Temp (24hrs), Av.6 °F (37 °C), Min:98.1 °F (36.7 °C), Max:99 °F (37.2 °C)      No intake/output data recorded.  1901 -  0700  In: 50 [I.V.:50]  Out: 2100 [Urine:2100]    PHYSICAL EXAM:    Physical Exam  Constitutional:       Appearance: She is obese. She is ill-appearing. HENT:      Nose: Congestion and rhinorrhea present. Cardiovascular:      Rate and Rhythm: Normal rate. Rhythm irregular. Pulmonary:      Effort: No respiratory distress. Breath sounds: Wheezing present. Neurological:      Motor: Weakness present.           Data Review    Recent Results (from the past 24 hour(s))   MAGNESIUM    Collection Time: 21  3:00 PM   Result Value Ref Range    Magnesium 2.2 1.6 - 2.4 mg/dL   METABOLIC PANEL, BASIC    Collection Time: 21  3:00 PM   Result Value Ref Range    Sodium 131 (L) 136 - 145 mmol/L    Potassium 3.8 3.5 - 5.1 mmol/L    Chloride 95 (L) 97 - 108 mmol/L    CO2 31 21 - 32 mmol/L    Anion gap 5 5 - 15 mmol/L    Glucose 141 (H) 65 - 100 mg/dL    BUN 8 6 - 20 mg/dL Creatinine 0.59 0.55 - 1.02 mg/dL    BUN/Creatinine ratio 14 12 - 20      GFR est AA >60 >60 ml/min/1.73m2    GFR est non-AA >60 >60 ml/min/1.73m2    Calcium 7.7 (L) 8.5 - 10.1 mg/dL   TROPONIN I    Collection Time: 08/19/21  3:19 PM   Result Value Ref Range    Troponin-I, Qt. <0.05 <0.05 ng/mL   TROPONIN I    Collection Time: 08/20/21  4:34 AM   Result Value Ref Range    Troponin-I, Qt. <0.05 <0.43 ng/mL   METABOLIC PANEL, BASIC    Collection Time: 08/20/21  6:10 AM   Result Value Ref Range    Sodium 133 (L) 136 - 145 mmol/L    Potassium 3.5 3.5 - 5.1 mmol/L    Chloride 99 97 - 108 mmol/L    CO2 29 21 - 32 mmol/L    Anion gap 5 5 - 15 mmol/L    Glucose 107 (H) 65 - 100 mg/dL    BUN 8 6 - 20 mg/dL    Creatinine 0.49 (L) 0.55 - 1.02 mg/dL    BUN/Creatinine ratio 16 12 - 20      GFR est AA >60 >60 ml/min/1.73m2    GFR est non-AA >60 >60 ml/min/1.73m2    Calcium 7.7 (L) 8.5 - 10.1 mg/dL   CBC W/O DIFF    Collection Time: 08/20/21  6:10 AM   Result Value Ref Range    WBC 11.4 (H) 3.6 - 11.0 K/uL    RBC 3.36 (L) 3.80 - 5.20 M/uL    HGB 10.3 (L) 11.5 - 16.0 g/dL    HCT 31.1 (L) 35.0 - 47.0 %    MCV 92.6 80.0 - 99.0 FL    MCH 30.7 26.0 - 34.0 PG    MCHC 33.1 30.0 - 36.5 g/dL    RDW 12.5 11.5 - 14.5 %    PLATELET 104 796 - 527 K/uL    MPV 10.8 8.9 - 12.9 FL    NRBC 0.0 0.0  WBC    ABSOLUTE NRBC 0.00 0.00 - 0.01 K/uL   PROTHROMBIN TIME + INR    Collection Time: 08/20/21  6:10 AM   Result Value Ref Range    Prothrombin time 26.0 (H) 11.9 - 14.7 sec    INR 2.4 (H) 0.9 - 1.1         XR CHEST PORT   Final Result   Basilar opacities, nonspecific, but could represent pneumonia in the appropriate   clinical setting. Radiographic follow-up is recommended.           Principal Problem:    Hypokalemia (8/19/2021)    Active Problems:    Hypomagnesemia (8/19/2021)      Pneumonia (8/19/2021)      Hyponatremia (8/19/2021)      Abnormal EKG (8/19/2021)        Assessment/Plan:     Likely bibasilar pneumonia  -Shortness of breath, cough, leukocytosis, bibasilar opacities  -Empiric antibiotic coverage with ceftriaxone, doxycycline  -Respiratory culture, urine antigens  -Supportive oxygen as needed     Hypomagnesemia  -Serum magnesium of 0.8  -IV magnesium sulfate     Hypokalemia  -IV and p.o. KCl  -Follow-up BMP     Hyponatremia  -Anticipate improvement with volume expansion  -Gentle IV NS      Abnormal EKG  -T wave inversion and ST depression, lateral leads  -No chest pain, normal serial troponin  -Anticoagulated on warfarin  -Consider stress test once clinically stable     Urinary retention  -Gray placed in the ER  -Voiding trial     Atrial fibrillation  -Diltiazem  -Warfarin. Therapeutic monitoring with pharmacy consult     Essential tremor  -Propranolol     Hypothyroidism  -Synthroid     Chronic steroid therapy  -On prednisone for fibromyalgia    Weakness  -PT OT and case management for disposition needs    DVT Prophylaxis: Coumadin  Code Status: Full Code  POA/NOK:    Disposition and discharge barriers:   · Wean oxygen  · Pneumonia improvement    Care Plan discussed with: Patient, RN, IDR team  _____________________________________________________________________________  Time spent in direct care including coordination of service, review of data and examination: > 35 minutes    ______________________________________________________________________________    Si Reading, NP    This is dictation was done by dragon, computer voice recognition software. Quite often unanticipated grammatical, syntax, homophones and other interpretive errors or inadvertently transcribed by the computer software. Please excuse errors that have escaped final proofreading. Thank you.

## 2021-08-20 NOTE — PROGRESS NOTES
Order for NS @ 75 mL/hour . Contacted Dr. Ela Christopher (on call) to see if order needs to be renewed. Patient fluid intake is poor at this time. Order received to renew order for 12 more hours.

## 2021-08-20 NOTE — PROGRESS NOTES
PHYSICAL THERAPY EVALUATION  Patient: Alejandro Daugherty (20 y.o. female)  Date: 8/20/2021  Primary Diagnosis: Hypokalemia [E87.6]  Hypomagnesemia [E83.42]  Pneumonia [J18.9]        Precautions: falls    ASSESSMENT  This is a 81 y/o female came to  [facility]  with c/o shortness of breath since last night with associated productive cough. She has nasal congestion and some runny nose but denies fever or chills  , admitted on 8/18/21 for hypokalemia , hypomagnesemia and pneumonia  . Chest x-ray shows bibasilar opacities suspicious for pneumonia. SARS-CoV-2 PCR is negative. Pt has past medical history  of atrial fibrillation on warfarin, hypothyroidism, essential tremor, on chronic steroid treatment for fibromyalgia. Pt received semi-supine , agreeable for PT eval. Pt is A& O x 4 , as per pt report , she lives with alone in a apartment on first floor , 3 steps to enter , HR on bilateral sides ,  IND with ADL's and IND for functional transfers/mobility with no AD prior to admission . Based on the objective data described below, the patient presents with anxiety , strength b/l LE - WFL , balance deficits , generalized weakness , decreased activity tolerance and increased need for assist with functional mobility (  Mod I for rolling from side to side  , mod I for supine <>sit transfers , intact static sitting balance ,mod I for sit <>stand , SUP for SPT , good static  standing balance , is able to ambulate - 100' ,SUP without AD, SBA . Pt SOB post ambulation ,SPo2 - 96% ,  practiced pursed lip DBEx's , felt better . Pt tearful and anxious , provided emotional support  and therapeutic listening . Pt is close to her functional baseline would benefit from skilled PT services while at [facility] in order to increase safety and independence with functional transfers/mobility. Recommend d/c to home with HHPT  when medically appropriate .      Current Level of Function Impacting Discharge (mobility/balance): Requires SUP/mod I for transfers and SBA for ambulation     Functional Outcome Measure: The patient scored 21 on the AM PAC basic mobility outcome measure which is indicative of low complexity. Other factors to consider for discharge: PLOF , family support        PLAN :  Recommendations and Planned Interventions: transfer training, gait training, therapeutic exercises, neuromuscular re-education, patient and family training/education and therapeutic activities      Frequency/Duration: Patient will be followed by physical therapy:  2 times a week to address goals. Recommendation for discharge: (in order for the patient to meet his/her long term goals)  HHPT    This discharge recommendation:  Has been made in collaboration with the attending provider and/or case management    IF patient discharges home will need the following DME: none         SUBJECTIVE:   Patient stated I want to go home    OBJECTIVE DATA SUMMARY:   HISTORY:    Past Medical History:   Diagnosis Date    Atrial fibrillation ([facility] [address]     Fibromyalgia     Hypothyroid      Past Surgical History:   Procedure Laterality Date    HX BREAST BIOPSY Left     benign    HX HYSTERECTOMY      age 40       Personal factors and/or comorbidities impacting plan of care:     Home Situation  Home Environment: Apartment  # Steps to Enter: 3  Rails to Enter: Yes  Hand Rails : Bilateral  Wheelchair Ramp: No  One/Two Story Residence: One story  Living Alone: Yes  Support Systems: Family member(s)  Current DME Used/Available at Home: None    PLOF: Pt IND for ADLS/IADLS, IND with mobility prior to admission. EXAMINATION/PRESENTATION/DECISION MAKING:   Critical Behavior:  Neurologic State: Alert  Orientation Level: Oriented X4  Cognition: Appropriate decision making, Appropriate safety awareness, Follows commands     Hearing:     Skin:  Intact where exposed    Range Of Motion:  AROM: Within functional limits  Strength:    Strength:  Within functional limits  Tone & Sensation:   Tone: Normal  Sensation: Intact  Functional Mobility:  Bed Mobility:  Rolling: Modified independent  Supine to Sit: Modified independent  Sit to Supine: Modified independent  Scooting: Modified independent  Transfers:  Sit to Stand: Modified independent  Stand to Sit: Modified independent  Stand Pivot Transfers: Supervision     Balance:   Sitting: Intact; Without support  Standing: Intact; Without support  Ambulation/Gait Training:  Distance (ft): 100 Feet (ft)  Assistive Device: Gait belt  Ambulation - Level of Assistance: Stand-by assistance      Functional Measure:    [address] [place] [address] AM-PAC 6 Clicks         Basic Mobility Inpatient Short Form  How much difficulty does the patient currently have. .. Unable A Lot A Little None   1. Turning over in bed (including adjusting bedclothes, sheets and blankets)? [] 1   [] 2   [] 3   [x] 4   2. Sitting down on and standing up from a chair with arms ( e.g., wheelchair, bedside commode, etc.)   [] 1   [] 2   [] 3   [x] 4   3. Moving from lying on back to sitting on the side of the bed? [] 1   [] 2   [] 3   [x] 4          How much help from another person does the patient currently need. .. Total A Lot A Little None   4. Moving to and from a bed to a chair (including a wheelchair)? [] 1   [] 2   [x] 3   [] 4   5. Need to walk in hospital room? [] 1   [] 2   [x] 3   [] 4   6. Climbing 3-5 steps with a railing? [] 1   [] 2   [x] 3   [] 4   © 2007, Trustees of [address] [place] [address], under license to [employer]. All rights reserved     Score:  Initial: 21 Most Recent: X (Date: -8/20/21- )   Interpretation of Tool:  Represents activities that are increasingly more difficult (i.e. Bed mobility, Transfers, Gait).   Score 24 23 22-20 19-15 14-10 9-7 6   Modifier CH CI CJ CK CL CM CN          Physical Therapy Evaluation Charge Determination   History Examination Presentation Decision-Making   LOW Complexity : Zero comorbidities / personal factors that will impact the outcome / POC LOW Complexity : 1-2 Standardized tests and measures addressing body structure, function, activity limitation and / or participation in recreation  LOW Complexity : Stable, uncomplicated  Other Functional Measure [facility] 6 low complexity      Based on the above components, the patient evaluation is determined to be of the following complexity level: LOW     Pain Ratin/10    Activity Tolerance:   Fair, requires frequent rest breaks and observed SOB with activity  Please refer to the flowsheet for vital signs taken during this treatment. After treatment patient left in no apparent distress:   Supine in bed, Call bell within reach and Bed / chair alarm activated    COMMUNICATION/EDUCATION:   The patients plan of care was discussed with: Registered nurse. Fall prevention education was provided and the patient/caregiver indicated understanding. and Patient/family agree to work toward stated goals and plan of care. Problem: Mobility Impaired (Adult and Pediatric)  Goal: *Acute Goals and Plan of Care (Insert Text)  Description: Pt will be I with LE HEP in 7 days. Pt will perform transfers independently in 7 days. Pt will amb >250' feet with LRAD or without AD  safely with mod I in 7 days.    Outcome: Not Met       Thank you for this referral.  Modesto Zaragoza   Time Calculation: 50 mins

## 2021-08-20 NOTE — PROGRESS NOTES
Consult for Warfarin Dosing by Pharmacy by Dr. Alana Mar provided for this 80 y.o.  female , for indication of A. fib    Day of Therapy: 2  INR Goal: 2-3    Order entered for  Warfarin  3 (mg) ordered to be given today at 18:00. Previous dose given 3mg   PT/INR INR   Date Value Ref Range Status   08/20/2021 2.4 (H) 0.9 - 1.1   Final   08/19/2021 2.7 (H) 0.9 - 1.1   Final      Platelets Lab Results   Component Value Date/Time    PLATELET 292 37/34/5914 06:10 AM      H/H Lab Results   Component Value Date/Time    HGB 10.3 (L) 08/20/2021 06:10 AM        Pharmacy to follow daily and will provide subsequent Warfarin dosing based on clinical status.   _________________________________     Pharmacist Celia Gold

## 2021-08-21 LAB
ANION GAP SERPL CALC-SCNC: 6 MMOL/L (ref 5–15)
BASOPHILS # BLD: 0 K/UL (ref 0–0.1)
BASOPHILS NFR BLD: 0 % (ref 0–1)
BUN SERPL-MCNC: 6 MG/DL (ref 6–20)
BUN/CREAT SERPL: 13 (ref 12–20)
CA-I BLD-MCNC: 8.2 MG/DL (ref 8.5–10.1)
CHLORIDE SERPL-SCNC: 101 MMOL/L (ref 97–108)
CO2 SERPL-SCNC: 30 MMOL/L (ref 21–32)
CREAT SERPL-MCNC: 0.47 MG/DL (ref 0.55–1.02)
DIFFERENTIAL METHOD BLD: ABNORMAL
EOSINOPHIL # BLD: 0.2 K/UL (ref 0–0.4)
EOSINOPHIL NFR BLD: 3 % (ref 0–7)
ERYTHROCYTE [DISTWIDTH] IN BLOOD BY AUTOMATED COUNT: 12.7 % (ref 11.5–14.5)
GLUCOSE SERPL-MCNC: 81 MG/DL (ref 65–100)
HCT VFR BLD AUTO: 32.7 % (ref 35–47)
HGB BLD-MCNC: 10.7 G/DL (ref 11.5–16)
IMM GRANULOCYTES # BLD AUTO: 0.1 K/UL (ref 0–0.04)
IMM GRANULOCYTES NFR BLD AUTO: 1 % (ref 0–0.5)
INR PPP: 2.1 (ref 0.9–1.1)
LYMPHOCYTES # BLD: 2.8 K/UL (ref 0.8–3.5)
LYMPHOCYTES NFR BLD: 31 % (ref 12–49)
MCH RBC QN AUTO: 30.5 PG (ref 26–34)
MCHC RBC AUTO-ENTMCNC: 32.7 G/DL (ref 30–36.5)
MCV RBC AUTO: 93.2 FL (ref 80–99)
MONOCYTES # BLD: 0.8 K/UL (ref 0–1)
MONOCYTES NFR BLD: 9 % (ref 5–13)
NEUTS SEG # BLD: 4.9 K/UL (ref 1.8–8)
NEUTS SEG NFR BLD: 56 % (ref 32–75)
NRBC # BLD: 0 K/UL (ref 0–0.01)
NRBC BLD-RTO: 0 PER 100 WBC
PLATELET # BLD AUTO: 277 K/UL (ref 150–400)
PMV BLD AUTO: 10.1 FL (ref 8.9–12.9)
POTASSIUM SERPL-SCNC: 3.5 MMOL/L (ref 3.5–5.1)
PROTHROMBIN TIME: 23 SEC (ref 11.9–14.7)
RBC # BLD AUTO: 3.51 M/UL (ref 3.8–5.2)
SODIUM SERPL-SCNC: 137 MMOL/L (ref 136–145)
WBC # BLD AUTO: 8.9 K/UL (ref 3.6–11)

## 2021-08-21 PROCEDURE — 65270000029 HC RM PRIVATE

## 2021-08-21 PROCEDURE — 36415 COLL VENOUS BLD VENIPUNCTURE: CPT

## 2021-08-21 PROCEDURE — 74011250636 HC RX REV CODE- 250/636: Performed by: INTERNAL MEDICINE

## 2021-08-21 PROCEDURE — 74011250637 HC RX REV CODE- 250/637: Performed by: NURSE PRACTITIONER

## 2021-08-21 PROCEDURE — 74011000258 HC RX REV CODE- 258: Performed by: INTERNAL MEDICINE

## 2021-08-21 PROCEDURE — 74011636637 HC RX REV CODE- 636/637: Performed by: INTERNAL MEDICINE

## 2021-08-21 PROCEDURE — 74011250637 HC RX REV CODE- 250/637: Performed by: INTERNAL MEDICINE

## 2021-08-21 PROCEDURE — 80048 BASIC METABOLIC PNL TOTAL CA: CPT

## 2021-08-21 PROCEDURE — 85610 PROTHROMBIN TIME: CPT

## 2021-08-21 PROCEDURE — 85025 COMPLETE CBC W/AUTO DIFF WBC: CPT

## 2021-08-21 RX ORDER — DOXYCYCLINE 100 MG/1
100 CAPSULE ORAL EVERY 12 HOURS
Status: DISCONTINUED | OUTPATIENT
Start: 2021-08-21 | End: 2021-08-23 | Stop reason: HOSPADM

## 2021-08-21 RX ORDER — AMOXICILLIN AND CLAVULANATE POTASSIUM 875; 125 MG/1; MG/1
1 TABLET, FILM COATED ORAL EVERY 12 HOURS
Status: DISCONTINUED | OUTPATIENT
Start: 2021-08-21 | End: 2021-08-23 | Stop reason: HOSPADM

## 2021-08-21 RX ORDER — BENZONATATE 100 MG/1
200 CAPSULE ORAL 3 TIMES DAILY
Status: DISCONTINUED | OUTPATIENT
Start: 2021-08-21 | End: 2021-08-23 | Stop reason: HOSPADM

## 2021-08-21 RX ORDER — GUAIFENESIN 600 MG/1
1200 TABLET, EXTENDED RELEASE ORAL EVERY 12 HOURS
Status: DISCONTINUED | OUTPATIENT
Start: 2021-08-21 | End: 2021-08-23 | Stop reason: HOSPADM

## 2021-08-21 RX ADMIN — DOXYCYCLINE 100 MG: 100 INJECTION, POWDER, LYOPHILIZED, FOR SOLUTION INTRAVENOUS at 10:35

## 2021-08-21 RX ADMIN — PROPRANOLOL HYDROCHLORIDE 10 MG: 20 TABLET ORAL at 21:50

## 2021-08-21 RX ADMIN — PROPRANOLOL HYDROCHLORIDE 10 MG: 20 TABLET ORAL at 10:36

## 2021-08-21 RX ADMIN — GUAIFENESIN 1200 MG: 600 TABLET, EXTENDED RELEASE ORAL at 13:40

## 2021-08-21 RX ADMIN — GUAIFENESIN 1200 MG: 600 TABLET, EXTENDED RELEASE ORAL at 21:50

## 2021-08-21 RX ADMIN — AMOXICILLIN AND CLAVULANATE POTASSIUM 1 TABLET: 875; 125 TABLET, FILM COATED ORAL at 13:40

## 2021-08-21 RX ADMIN — DILTIAZEM HYDROCHLORIDE 240 MG: 120 CAPSULE, COATED, EXTENDED RELEASE ORAL at 10:35

## 2021-08-21 RX ADMIN — ACETAMINOPHEN 650 MG: 325 TABLET ORAL at 21:50

## 2021-08-21 RX ADMIN — DOXYCYCLINE HYCLATE 100 MG: 100 CAPSULE ORAL at 13:40

## 2021-08-21 RX ADMIN — LEVOTHYROXINE SODIUM 125 MCG: 0.03 TABLET ORAL at 06:17

## 2021-08-21 RX ADMIN — PREDNISONE 2.5 MG: 5 TABLET ORAL at 10:35

## 2021-08-21 RX ADMIN — Medication 10 ML: at 13:41

## 2021-08-21 RX ADMIN — ACETAMINOPHEN 650 MG: 325 TABLET ORAL at 13:40

## 2021-08-21 RX ADMIN — AMOXICILLIN AND CLAVULANATE POTASSIUM 1 TABLET: 875; 125 TABLET, FILM COATED ORAL at 21:50

## 2021-08-21 RX ADMIN — BENZONATATE 200 MG: 100 CAPSULE ORAL at 18:10

## 2021-08-21 RX ADMIN — DULOXETINE HYDROCHLORIDE 60 MG: 30 CAPSULE, DELAYED RELEASE ORAL at 10:35

## 2021-08-21 RX ADMIN — Medication 10 ML: at 21:52

## 2021-08-21 RX ADMIN — WARFARIN SODIUM 4 MG: 3 TABLET ORAL at 18:10

## 2021-08-21 RX ADMIN — DOXYCYCLINE HYCLATE 100 MG: 100 CAPSULE ORAL at 21:50

## 2021-08-21 RX ADMIN — BENZONATATE 200 MG: 100 CAPSULE ORAL at 21:50

## 2021-08-21 RX ADMIN — Medication 10 ML: at 06:18

## 2021-08-21 NOTE — PROGRESS NOTES
Spo2 on room air at rest 95%  Patient ambulated on room air, spo2 on room air was 92% during ambulation

## 2021-08-21 NOTE — PROGRESS NOTES
Hospitalist Progress Note    Subjective:   Daily Progress Note: 8/21/2021 10:02 AM    Hospital Course:     Patient is 60-year-old female with a PMH significant for atrial fibrillation on Coumadin, hypothyroidism, essential tremor, fibromyalgia/steroid-dependent. She comes in to the emergency room with shortness of breath with associated productive cough, nasal congestion, rhinorrhea and weakness. On examination she is hypoxic 88% on room air placed on 3 L nasal cannula. X-ray reveals bibasilar opacities suspicious for pneumonia. COVID-19 PCR negative. Significant labs leukocytosis, hypokalemia of 2.9 and hypomagnesium of 0.8. Admitted for further management of community-acquired pneumonia, hypokalemia hypomagnesium. Having urinary retention Gray catheter was placed. Started on IV mag and potassium replacement. IV ceftriaxone and doxycycline initiated. Patient has desire to go home. Will transition from IV to oral medications. PT OT recommending home with home health PT and OT. We will have respiratory therapy assess resting and ambulating pulse oximetry for oxygen need. Subjective: Follow-up examination of patient at the bedside. She states she feels a little better and would prefer to go home. Discussed with her the plan of care at length. We will have respiratory assess oxygen need.     Current Facility-Administered Medications   Medication Dose Route Frequency    guaiFENesin ER (MUCINEX) tablet 1,200 mg  1,200 mg Oral Q12H    benzonatate (TESSALON) capsule 200 mg  200 mg Oral TID    doxycycline (MONODOX) capsule 100 mg  100 mg Oral Q12H    amoxicillin-clavulanate (AUGMENTIN) 875-125 mg per tablet 1 Tablet  1 Tablet Oral Q12H    dilTIAZem ER (CARDIZEM CD) capsule 240 mg  240 mg Oral DAILY    levothyroxine (SYNTHROID) tablet 125 mcg  125 mcg Oral 6am    DULoxetine (CYMBALTA) capsule 60 mg  60 mg Oral DAILY    predniSONE (DELTASONE) tablet 2.5 mg  2.5 mg Oral DAILY    traMADoL (ULTRAM) tablet 50 mg  50 mg Oral Q8H PRN    propranoloL (INDERAL) tablet 10 mg  10 mg Oral BID    sodium chloride (NS) flush 5-40 mL  5-40 mL IntraVENous Q8H    sodium chloride (NS) flush 5-40 mL  5-40 mL IntraVENous PRN    polyethylene glycol (MIRALAX) packet 17 g  17 g Oral DAILY PRN    ondansetron (ZOFRAN ODT) tablet 4 mg  4 mg Oral Q8H PRN    Or    ondansetron (ZOFRAN) injection 4 mg  4 mg IntraVENous Q6H PRN    acetaminophen (TYLENOL) suppository 650 mg  650 mg Rectal Q6H PRN    Or    acetaminophen (TYLENOL) tablet 650 mg  650 mg Oral Q6H PRN    WARFARIN INFORMATION NOTE (COUMADIN)   Other Rx Dosing/Monitoring        REVIEW OF SYSTEMS    Review of Systems   Constitutional: Positive for malaise/fatigue. HENT: Positive for congestion and sore throat. Respiratory: Positive for cough. Negative for sputum production, shortness of breath and wheezing. Cardiovascular: Negative for chest pain and palpitations. Gastrointestinal: Negative. Musculoskeletal: Positive for myalgias. Neurological: Positive for weakness. Objective:     Visit Vitals  BP (!) 123/56   Pulse 80   Temp 98.1 °F (36.7 °C)   Resp 18   Ht 5' 2\" (1.575 m)   Wt 86.2 kg (190 lb)   SpO2 94%   BMI 34.75 kg/m²    O2 Flow Rate (L/min): 1 l/min O2 Device: Nasal cannula    Temp (24hrs), Av.4 °F (36.9 °C), Min:98.1 °F (36.7 °C), Max:98.6 °F (37 °C)      No intake/output data recorded.  1901 -  0700  In: -   Out: 1400 [Urine:1400]    PHYSICAL EXAM:    Physical Exam  Constitutional:       Appearance: She is obese. She is ill-appearing. HENT:      Nose: Congestion and rhinorrhea present. Cardiovascular:      Rate and Rhythm: Normal rate. Rhythm irregular. Pulmonary:      Effort: No respiratory distress. Breath sounds: Wheezing present. Neurological:      Motor: Weakness present.           Data Review    Recent Results (from the past 24 hour(s))   PROTHROMBIN TIME + INR    Collection Time: 21  2:55 AM   Result Value Ref Range    Prothrombin time 23.0 (H) 11.9 - 14.7 sec    INR 2.1 (H) 0.9 - 1.1     CBC WITH AUTOMATED DIFF    Collection Time: 08/21/21  2:55 AM   Result Value Ref Range    WBC 8.9 3.6 - 11.0 K/uL    RBC 3.51 (L) 3.80 - 5.20 M/uL    HGB 10.7 (L) 11.5 - 16.0 g/dL    HCT 32.7 (L) 35.0 - 47.0 %    MCV 93.2 80.0 - 99.0 FL    MCH 30.5 26.0 - 34.0 PG    MCHC 32.7 30.0 - 36.5 g/dL    RDW 12.7 11.5 - 14.5 %    PLATELET 662 481 - 966 K/uL    MPV 10.1 8.9 - 12.9 FL    NRBC 0.0 0.0  WBC    ABSOLUTE NRBC 0.00 0.00 - 0.01 K/uL    NEUTROPHILS 56 32 - 75 %    LYMPHOCYTES 31 12 - 49 %    MONOCYTES 9 5 - 13 %    EOSINOPHILS 3 0 - 7 %    BASOPHILS 0 0 - 1 %    IMMATURE GRANULOCYTES 1 (H) 0 - 0.5 %    ABS. NEUTROPHILS 4.9 1.8 - 8.0 K/UL    ABS. LYMPHOCYTES 2.8 0.8 - 3.5 K/UL    ABS. MONOCYTES 0.8 0.0 - 1.0 K/UL    ABS. EOSINOPHILS 0.2 0.0 - 0.4 K/UL    ABS. BASOPHILS 0.0 0.0 - 0.1 K/UL    ABS. IMM. GRANS. 0.1 (H) 0.00 - 0.04 K/UL    DF AUTOMATED     METABOLIC PANEL, BASIC    Collection Time: 08/21/21  2:55 AM   Result Value Ref Range    Sodium 137 136 - 145 mmol/L    Potassium 3.5 3.5 - 5.1 mmol/L    Chloride 101 97 - 108 mmol/L    CO2 30 21 - 32 mmol/L    Anion gap 6 5 - 15 mmol/L    Glucose 81 65 - 100 mg/dL    BUN 6 6 - 20 mg/dL    Creatinine 0.47 (L) 0.55 - 1.02 mg/dL    BUN/Creatinine ratio 13 12 - 20      GFR est AA >60 >60 ml/min/1.73m2    GFR est non-AA >60 >60 ml/min/1.73m2    Calcium 8.2 (L) 8.5 - 10.1 mg/dL       XR CHEST PORT   Final Result   Basilar opacities, nonspecific, but could represent pneumonia in the appropriate   clinical setting. Radiographic follow-up is recommended.           Principal Problem:    Hypokalemia (8/19/2021)    Active Problems:    Hypomagnesemia (8/19/2021)      Pneumonia (8/19/2021)      Hyponatremia (8/19/2021)      Abnormal EKG (8/19/2021)        Assessment/Plan:     Likely bibasilar pneumonia  -Shortness of breath, cough, leukocytosis, bibasilar opacities  -Transition today from IV to oral doxy and Augmentin  -Respiratory culture, urine antigens  -Supportive oxygen as needed-RT to assess home oxygen requirement  -Mucinex and Tessalon scheduled    Hypomagnesemia  -Serum magnesium of 0.8  -IV magnesium sulfate     Hypokalemia  -IV and p.o. KCl  -Follow-up BMP     Hyponatremia  -Anticipate improvement with volume expansion  -Gentle IV NS      Abnormal EKG  -T wave inversion and ST depression, lateral leads  -No chest pain, normal serial troponin  -Anticoagulated on warfarin  -Consider stress test once clinically stable     Urinary retention  -Gray placed in the ER  -Voiding trial     Atrial fibrillation  -Diltiazem  -Warfarin. Therapeutic monitoring with pharmacy consult     Essential tremor  -Propranolol     Hypothyroidism  -Synthroid     Chronic steroid therapy  -On prednisone for fibromyalgia    Weakness  -PT OT and case management for disposition needs    DVT Prophylaxis: Coumadin  Code Status: Full Code  POA/NOK:    Disposition and discharge barriers:   · Wean oxygen-assess home O2 need  · Pneumonia improvement-start oral antibiotic coverage  · Case management to arrange home health  · Discontinue Gray for voiding trial    Care Plan discussed with: Patient, RN, IDR team  _____________________________________________________________________________  Time spent in direct care including coordination of service, review of data and examination: > 35 minutes    ______________________________________________________________________________    Denia Do NP    This is dictation was done by dragon, computer voice recognition software. Quite often unanticipated grammatical, syntax, homophones and other interpretive errors or inadvertently transcribed by the computer software. Please excuse errors that have escaped final proofreading. Thank you.

## 2021-08-22 ENCOUNTER — APPOINTMENT (OUTPATIENT)
Dept: GENERAL RADIOLOGY | Age: 86
DRG: 193 | End: 2021-08-22
Attending: NURSE PRACTITIONER
Payer: MEDICARE

## 2021-08-22 LAB
ANION GAP SERPL CALC-SCNC: 4 MMOL/L (ref 5–15)
BASOPHILS # BLD: 0.1 K/UL (ref 0–0.1)
BASOPHILS NFR BLD: 1 % (ref 0–1)
BUN SERPL-MCNC: 5 MG/DL (ref 6–20)
BUN/CREAT SERPL: 10 (ref 12–20)
CA-I BLD-MCNC: 9 MG/DL (ref 8.5–10.1)
CHLORIDE SERPL-SCNC: 100 MMOL/L (ref 97–108)
CO2 SERPL-SCNC: 34 MMOL/L (ref 21–32)
CREAT SERPL-MCNC: 0.52 MG/DL (ref 0.55–1.02)
DIFFERENTIAL METHOD BLD: ABNORMAL
EOSINOPHIL # BLD: 0.2 K/UL (ref 0–0.4)
EOSINOPHIL NFR BLD: 2 % (ref 0–7)
ERYTHROCYTE [DISTWIDTH] IN BLOOD BY AUTOMATED COUNT: 12.6 % (ref 11.5–14.5)
GLUCOSE SERPL-MCNC: 90 MG/DL (ref 65–100)
HCT VFR BLD AUTO: 36.9 % (ref 35–47)
HGB BLD-MCNC: 12 G/DL (ref 11.5–16)
IMM GRANULOCYTES # BLD AUTO: 0.1 K/UL (ref 0–0.04)
IMM GRANULOCYTES NFR BLD AUTO: 1 % (ref 0–0.5)
INR PPP: 1.8 (ref 0.9–1.1)
LYMPHOCYTES # BLD: 2.9 K/UL (ref 0.8–3.5)
LYMPHOCYTES NFR BLD: 28 % (ref 12–49)
MCH RBC QN AUTO: 30.6 PG (ref 26–34)
MCHC RBC AUTO-ENTMCNC: 32.5 G/DL (ref 30–36.5)
MCV RBC AUTO: 94.1 FL (ref 80–99)
MONOCYTES # BLD: 1 K/UL (ref 0–1)
MONOCYTES NFR BLD: 9 % (ref 5–13)
NEUTS SEG # BLD: 6.3 K/UL (ref 1.8–8)
NEUTS SEG NFR BLD: 59 % (ref 32–75)
NRBC # BLD: 0 K/UL (ref 0–0.01)
NRBC BLD-RTO: 0 PER 100 WBC
PLATELET # BLD AUTO: 312 K/UL (ref 150–400)
PMV BLD AUTO: 10.1 FL (ref 8.9–12.9)
POTASSIUM SERPL-SCNC: 3.8 MMOL/L (ref 3.5–5.1)
PROTHROMBIN TIME: 21 SEC (ref 11.9–14.7)
RBC # BLD AUTO: 3.92 M/UL (ref 3.8–5.2)
SODIUM SERPL-SCNC: 138 MMOL/L (ref 136–145)
WBC # BLD AUTO: 10.6 K/UL (ref 3.6–11)

## 2021-08-22 PROCEDURE — 74011250637 HC RX REV CODE- 250/637: Performed by: INTERNAL MEDICINE

## 2021-08-22 PROCEDURE — 97165 OT EVAL LOW COMPLEX 30 MIN: CPT

## 2021-08-22 PROCEDURE — 74011250637 HC RX REV CODE- 250/637: Performed by: NURSE PRACTITIONER

## 2021-08-22 PROCEDURE — 71045 X-RAY EXAM CHEST 1 VIEW: CPT

## 2021-08-22 PROCEDURE — 85025 COMPLETE CBC W/AUTO DIFF WBC: CPT

## 2021-08-22 PROCEDURE — 74011636637 HC RX REV CODE- 636/637: Performed by: INTERNAL MEDICINE

## 2021-08-22 PROCEDURE — 36415 COLL VENOUS BLD VENIPUNCTURE: CPT

## 2021-08-22 PROCEDURE — 97530 THERAPEUTIC ACTIVITIES: CPT

## 2021-08-22 PROCEDURE — 65270000029 HC RM PRIVATE

## 2021-08-22 PROCEDURE — 80048 BASIC METABOLIC PNL TOTAL CA: CPT

## 2021-08-22 PROCEDURE — 85610 PROTHROMBIN TIME: CPT

## 2021-08-22 RX ORDER — BENZONATATE 200 MG/1
200 CAPSULE ORAL 3 TIMES DAILY
Qty: 21 CAPSULE | Refills: 0 | Status: SHIPPED | OUTPATIENT
Start: 2021-08-22 | End: 2021-08-29

## 2021-08-22 RX ORDER — AMOXICILLIN AND CLAVULANATE POTASSIUM 875; 125 MG/1; MG/1
1 TABLET, FILM COATED ORAL EVERY 12 HOURS
Qty: 7 TABLET | Refills: 0 | Status: SHIPPED | OUTPATIENT
Start: 2021-08-22 | End: 2021-08-26

## 2021-08-22 RX ORDER — DOXYCYCLINE 100 MG/1
100 CAPSULE ORAL EVERY 12 HOURS
Qty: 10 CAPSULE | Refills: 0 | Status: SHIPPED | OUTPATIENT
Start: 2021-08-22 | End: 2021-08-27

## 2021-08-22 RX ADMIN — ACETAMINOPHEN 650 MG: 325 TABLET ORAL at 22:09

## 2021-08-22 RX ADMIN — BENZONATATE 200 MG: 100 CAPSULE ORAL at 08:48

## 2021-08-22 RX ADMIN — PROPRANOLOL HYDROCHLORIDE 10 MG: 20 TABLET ORAL at 22:09

## 2021-08-22 RX ADMIN — AMOXICILLIN AND CLAVULANATE POTASSIUM 1 TABLET: 875; 125 TABLET, FILM COATED ORAL at 22:09

## 2021-08-22 RX ADMIN — GUAIFENESIN 1200 MG: 600 TABLET, EXTENDED RELEASE ORAL at 22:09

## 2021-08-22 RX ADMIN — AMOXICILLIN AND CLAVULANATE POTASSIUM 1 TABLET: 875; 125 TABLET, FILM COATED ORAL at 08:49

## 2021-08-22 RX ADMIN — PREDNISONE 2.5 MG: 5 TABLET ORAL at 08:48

## 2021-08-22 RX ADMIN — LEVOTHYROXINE SODIUM 125 MCG: 0.03 TABLET ORAL at 05:30

## 2021-08-22 RX ADMIN — GUAIFENESIN 1200 MG: 600 TABLET, EXTENDED RELEASE ORAL at 08:48

## 2021-08-22 RX ADMIN — BENZONATATE 200 MG: 100 CAPSULE ORAL at 22:09

## 2021-08-22 RX ADMIN — DULOXETINE HYDROCHLORIDE 60 MG: 30 CAPSULE, DELAYED RELEASE ORAL at 08:48

## 2021-08-22 RX ADMIN — DILTIAZEM HYDROCHLORIDE 240 MG: 120 CAPSULE, COATED, EXTENDED RELEASE ORAL at 08:48

## 2021-08-22 RX ADMIN — WARFARIN SODIUM 4 MG: 3 TABLET ORAL at 18:49

## 2021-08-22 RX ADMIN — DOXYCYCLINE HYCLATE 100 MG: 100 CAPSULE ORAL at 08:48

## 2021-08-22 RX ADMIN — Medication 10 ML: at 05:30

## 2021-08-22 RX ADMIN — Medication 10 ML: at 18:45

## 2021-08-22 RX ADMIN — Medication 10 ML: at 22:12

## 2021-08-22 RX ADMIN — DOXYCYCLINE HYCLATE 100 MG: 100 CAPSULE ORAL at 22:11

## 2021-08-22 RX ADMIN — BENZONATATE 200 MG: 100 CAPSULE ORAL at 18:45

## 2021-08-22 RX ADMIN — PROPRANOLOL HYDROCHLORIDE 10 MG: 20 TABLET ORAL at 08:48

## 2021-08-22 NOTE — ROUTINE PROCESS
Bedside and Verbal shift change report given to DAVID Bolanos RN (oncoming nurse) by Christofer Webb (offgoing nurse). Report included the following information SBAR, Kardex, MAR, Accordion, Recent Results, Med Rec Status and Quality Measures.

## 2021-08-22 NOTE — PROGRESS NOTES
Consult for Warfarin Dosing by Pharmacy by Dr. Ana Kemp provided for this 80 y.o.  female , for indication of A. fib     Day of Therapy: 3  INR Goal: 2-3     Order entered for  Warfarin  4 (mg) ordered to be given today at 18:00. Previous dose given 4 mg   PT/INR INR   Date Value Ref Range Status   08/22/2021 1.8 (H) 0.9 - 1.1   Final   08/21/2021 2.1 (H) 0.9 - 1.1   Final   08/20/2021 2.4 (H) 0.9 - 1.1   Final      Platelets Lab Results   Component Value Date/Time    PLATELET 998 64/09/0783 08:17 AM      H/H Lab Results   Component Value Date/Time    HGB 12.0 08/22/2021 08:17 AM        Pharmacy to follow daily and will provide subsequent Warfarin dosing based on clinical status.   _________________________________     Pharmacist Keanu Fierro PHARMD

## 2021-08-22 NOTE — PROGRESS NOTES
OCCUPATIONAL THERAPY EVALUATION/DISCHARGE  Patient: Virginia Bell (70 y.o. female)  Date: 8/22/2021  Primary Diagnosis: Hypokalemia [E87.6]  Hypomagnesemia [E83.42]  Pneumonia [J18.9]        Precautions:      ASSESSMENT  Patient is 81 y/o female came to [facility] with c/o SOB sicne prior night, productive cough, nasal congestion and adm 8/18/2021 for hypokalemia, hypomagnesemia, pneumonia, hyponatremia, abnormal EKG, urinary retention. Pt has hx of Afib, fibromyalgia, hypothyroidism. Pt received seated EOB A&Ox4 and agreeable for OT eval/tx. Per pt report, pt lives alone in first floor apartment with 3 steps wide lyndon rails to enter and is MI for self care (with use of shower chair) and independent for functional transfers/mobility and reporting 1 fall in past 3 months. Pt currently presents at baseline independence for self care (independent LB dressing EOB, independent toileting/cloth mgmt and grooming standing sink simulated) and functional transfers/mobility (independent sit<->stand and toilet transfer with gait belt). Pt with no acute OT needs at this time thus will D/C after OT eval. Recommend discharge to home with [facility] when medically appropriate. Current Level of Function (ADLs/self-care): independent    Other factors to consider for discharge: at baseline independent functional status     PLAN :  Recommend with staff: allow toilet transfers    Recommendation for discharge: (in order for the patient to meet his/her long term goals)  HHOT    This discharge recommendation:  Has been made in collaboration with the attending provider and/or case management    IF patient discharges home will need the following DME: none       SUBJECTIVE:   Patient stated I get SOB when I go for longer walks\" and when asked if SOB is new or baseline pt stating baseline.     OBJECTIVE DATA SUMMARY:   HISTORY:   Past Medical History:   Diagnosis Date    Atrial fibrillation ([address]     Fibromyalgia     Hypothyroid      Past Surgical History:   Procedure Laterality Date    HX BREAST BIOPSY Left     benign    HX HYSTERECTOMY      age 40       Prior Level of Function/Environment/Context: MI for self care (using shower chair) and independent for functional transfers/mobility  Expanded or extensive additional review of patient history:   Home Situation  Home Environment: Apartment  # Steps to Enter: 3  Rails to Enter: Yes  Hand Rails : Bilateral  Wheelchair Ramp: No  One/Two Story Residence: One story  Living Alone: Yes  Support Systems: Family member(s)  Patient Expects to be Discharged to[de-identified] Rehabilitation facility  Current DME Used/Available at Home: Shower chair  Tub or Shower Type: Tub/Shower combination    EXAMINATION OF PERFORMANCE DEFICITS:  Cognitive/Behavioral Status:  Neurologic State: Alert  Orientation Level: Oriented X4     Range of Motion:  AROM: Within functional limits     Strength:  Strength: Within functional limits (grossly observed to be 3+/5)     Balance:  Sitting: Intact; Without support  Standing: Intact; Without support    Functional Mobility and Transfers for ADLs:  Bed Mobility:  Scooting: Independent    Transfers:  Sit to Stand: Independent  Stand to Sit: Independent  Bed to Chair: Independent  Bathroom Mobility: Independent  Toilet Transfer : Independent  Assistive Device : Gait Belt    ADL Assessment:     Oral Facial Hygiene/Grooming: Independent (standing sink simulated)    Lower Body Dressing: Independent    Toileting: Independent (simulated)     ADL Intervention and task modifications:     Grooming  Grooming Assistance: Independent (simulated)  Position Performed: Standing    Lower Body Dressing Assistance  Socks:  Independent  Leg Crossed Method Used: Yes  Position Performed: Seated edge of bed    Toileting  Toileting Assistance: Independent (simulated)  Bladder Hygiene: Independent  Bowel Hygiene: Independent  Clothing Management: [address] [facility] AM-[facility] \"6 Clicks\" Daily Activity Inpatient Short Form  How much help from another person does the patient currently need. .. Total; A Lot A Little None   1. Putting on and taking off regular lower body clothing? []  1 []  2 []  3 [x]  4   2. Bathing (including washing, rinsing, drying)? []  1 []  2 []  3 [x]  4   3. Toileting, which includes using toilet, bedpan or urinal? [] 1 []  2 []  3 [x]  4   4. Putting on and taking off regular upper body clothing? []  1 []  2 []  3 [x]  4   5. Taking care of personal grooming such as brushing teeth? []  1 []  2 []  3 [x]  4   6. Eating meals? []  1 []  2 []  3 [x]  4   © 2007, Trustees of [facility] [employer], under license to [employer]. All rights reserved     Score: 24/24     Interpretation of Tool:  Represents clinically-significant functional categories (i.e. Activities of daily living). Percentage of Impairment CH    0%   CI    1-19% CJ    20-39% CK    40-59% CL    60-79% CM    80-99% CN     100%   AMPA  Score 6-24 24 23 20-22 15-19 10-14 7-9 6          Occupational Therapy Evaluation Charge Determination   History Examination Decision-Making   LOW Complexity : Brief history review  LOW Complexity : 1-3 performance deficits relating to physical, cognitive , or psychosocial skils that result in activity limitations and / or participation restrictions  LOW Complexity : No comorbidities that affect functional and no verbal or physical assistance needed to complete eval tasks       Based on the above components, the patient evaluation is determined to be of the following complexity level: LOW   Pain Rating:  No pain reported    Activity Tolerance: WNL  Please refer to the flowsheet for vital signs taken during this treatment. After treatment patient left in no apparent distress:    Seated EOB with daughter present and call light within reach    COMMUNICATION/EDUCATION:   The patients plan of care was discussed with: Registered nurse and Physician. Thank you for this referral.  Laruth Money  Time Calculation: 18 mins

## 2021-08-22 NOTE — DISCHARGE SUMMARY
Hospitalist Discharge Summary     Patient ID:    Alma Murphy  [id number]  25 y.o.  6/6/1933    Admit date: 8/18/2021    Discharge date : 8/22/2021    Chronic Diagnoses:    Problem List as of 8/22/2021 Never Reviewed        Codes Class Noted - Resolved    * (Principal) Hypokalemia ICD-10-CM: E87.6  ICD-9-CM: 276.8  8/19/2021 - Present        Hypomagnesemia ICD-10-CM: E83.42  ICD-9-CM: 275.2  8/19/2021 - Present        Pneumonia ICD-10-CM: J18.9  ICD-9-CM: 452  8/19/2021 - Present        Hyponatremia ICD-10-CM: E87.1  ICD-9-CM: 276.1  8/19/2021 - Present        Abnormal EKG ICD-10-CM: R94.31  ICD-9-CM: 794.31  8/19/2021 - Present          22    Final Diagnoses:   Principal Problem:    Hypokalemia (8/19/2021)    Active Problems:    Hypomagnesemia (8/19/2021)      Pneumonia (8/19/2021)      Hyponatremia (8/19/2021)      Abnormal EKG (8/19/2021)        Reason for Hospitalization/Hospital Course:   Patient is 60-year-old female with a PMH significant for atrial fibrillation on Coumadin, hypothyroidism, essential tremor, fibromyalgia/steroid-dependent. She comes in to the emergency room with shortness of breath with associated productive cough, nasal congestion, rhinorrhea and weakness. On examination she is hypoxic 88% on room air placed on 3 L nasal cannula. X-ray reveals bibasilar opacities suspicious for pneumonia. COVID-19 PCR negative. Significant labs leukocytosis, hypokalemia of 2.9 and hypomagnesium of 0.8. Admitted for further management of community-acquired pneumonia, hypokalemia hypomagnesium. Having urinary retention Gray catheter was placed. Started on IV mag and potassium replacement. IV ceftriaxone and doxycycline initiated. Patient has done well, weaned off oxygen saturations are 92% while ambulating, not qualifying for home O2. We have transitioned her from IV to oral antibiotics. She desires to go home will discharge home with follow-up with her PCP in 1 week. Will arrange home health PT and OT. Discharge Medications:   Current Discharge Medication List      START taking these medications    Details   doxycycline (VIBRAMYCIN) 100 mg capsule Take 1 Capsule by mouth every twelve (12) hours for 5 days. Qty: 10 Capsule, Refills: 0  Start date: 8/22/2021, End date: 8/27/2021      benzonatate (TESSALON) 200 mg capsule Take 1 Capsule by mouth three (3) times daily for 7 days. Qty: 21 Capsule, Refills: 0  Start date: 8/22/2021, End date: 8/29/2021      amoxicillin-clavulanate (AUGMENTIN) 875-125 mg per tablet Take 1 Tablet by mouth every twelve (12) hours for 7 doses. Qty: 7 Tablet, Refills: 0  Start date: 8/22/2021, End date: 8/26/2021      guaiFENesin ER (MUCINEX) 1,200 mg Ta12 ER tablet Take 1 Tablet by mouth every twelve (12) hours for 7 days. Qty: 14 Tablet, Refills: 0  Start date: 8/22/2021, End date: 8/29/2021         CONTINUE these medications which have NOT CHANGED    Details   dilTIAZem ER (TIAZAC) 120 mg capsule Take 240 mg by mouth.      levothyroxine (SYNTHROID) 125 mcg tablet Take 125 mcg by mouth. DULoxetine (CYMBALTA) 60 mg capsule Take 60 mg by mouth.      predniSONE (DELTASONE) 2.5 mg tablet Take 2.5 mg by mouth daily. warfarin (COUMADIN) 1 mg tablet       traMADoL (ULTRAM) 50 mg tablet Take 50 mg by mouth every eight (8) hours as needed. propranoloL (INDERAL) 10 mg tablet Take 10 mg by mouth two (2) times a day. ALPRAZolam (XANAX) 0.25 mg tablet Take 0.25 mg by mouth. Start date: 8/18/2021               Follow up Care:    1. Denita Mulligan MD in 1-2 weeks. Follow-up Information     Follow up With Specialties Details Why Contact Info    Denita Mulligan MD Internal Medicine In 1 week  [address]  [phone]              Patient Follow Up Instructions:    Activity: PT/OT per Home Health  Diet:  Cardiac Diet  Wound Care: None needed     Condition at Discharge: Stable  __________________________________________________________________    Disposition  Home Health Care Svc  ____________________________________________________________________    Code Status:  Full Code  ___________________________________________________________________    Discharge Exam:  Patient seen and examined by me on discharge day. Pertinent Findings:  Gen:    Not in distress  Chest: Clear lungs  CVS:   Regular rhythm. No edema  Abd:  Soft, not distended, not tender  Neuro:  Alert        CONSULTATIONS: None    Significant Diagnostic Studies:   Recent Results (from the past 24 hour(s))   PROTHROMBIN TIME + INR    Collection Time: 08/22/21  8:17 AM   Result Value Ref Range    Prothrombin time 21.0 (H) 11.9 - 14.7 sec    INR 1.8 (H) 0.9 - 1.1     CBC WITH AUTOMATED DIFF    Collection Time: 08/22/21  8:17 AM   Result Value Ref Range    WBC 10.6 3.6 - 11.0 K/uL    RBC 3.92 3.80 - 5.20 M/uL    HGB 12.0 11.5 - 16.0 g/dL    HCT 36.9 35.0 - 47.0 %    MCV 94.1 80.0 - 99.0 FL    MCH 30.6 26.0 - 34.0 PG    MCHC 32.5 30.0 - 36.5 g/dL    RDW 12.6 11.5 - 14.5 %    PLATELET 350 760 - 237 K/uL    MPV 10.1 8.9 - 12.9 FL    NRBC 0.0 0.0  WBC    ABSOLUTE NRBC 0.00 0.00 - 0.01 K/uL    NEUTROPHILS 59 32 - 75 %    LYMPHOCYTES 28 12 - 49 %    MONOCYTES 9 5 - 13 %    EOSINOPHILS 2 0 - 7 %    BASOPHILS 1 0 - 1 %    IMMATURE GRANULOCYTES 1 (H) 0 - 0.5 %    ABS. NEUTROPHILS 6.3 1.8 - 8.0 K/UL    ABS. LYMPHOCYTES 2.9 0.8 - 3.5 K/UL    ABS. MONOCYTES 1.0 0.0 - 1.0 K/UL    ABS. EOSINOPHILS 0.2 0.0 - 0.4 K/UL    ABS. BASOPHILS 0.1 0.0 - 0.1 K/UL    ABS. IMM.  GRANS. 0.1 (H) 0.00 - 0.04 K/UL    DF AUTOMATED     METABOLIC PANEL, BASIC    Collection Time: 08/22/21  8:17 AM   Result Value Ref Range    Sodium 138 136 - 145 mmol/L    Potassium 3.8 3.5 - 5.1 mmol/L    Chloride 100 97 - 108 mmol/L    CO2 34 (H) 21 - 32 mmol/L    Anion gap 4 (L) 5 - 15 mmol/L    Glucose 90 65 - 100 mg/dL    BUN 5 (L) 6 - 20 mg/dL    Creatinine 0.52 (L) 0.55 - 1.02 mg/dL    BUN/Creatinine ratio 10 (L) 12 - 20      GFR est AA >60 >60 ml/min/1.73m2    GFR est non-AA >60 >60 ml/min/1.73m2    Calcium 9.0 8.5 - 10.1 mg/dL     XR CHEST PORT   Final Result   Improving right lung aeration. XR CHEST PORT   Final Result   Basilar opacities, nonspecific, but could represent pneumonia in the appropriate   clinical setting. Radiographic follow-up is recommended.           Time spent in direct and indirect care including coordination of services: Greater than 35 minutes    Signed:  Vanessa Cheatham NP  8/22/2021  10:19 AM

## 2021-08-22 NOTE — PROGRESS NOTES
Patient has dc orders for today. Patient and daughter would like to try for a SNF. Patient lives alone and does not feel safe by herself. Patient daughter stated that she had spoken with CM during week and informed them that they want to try for [facility]. Nurse Aware. Referral sent to [facility]. Choice on file. CM will continue to follow.

## 2021-08-23 VITALS
DIASTOLIC BLOOD PRESSURE: 73 MMHG | RESPIRATION RATE: 18 BRPM | HEART RATE: 81 BPM | BODY MASS INDEX: 34.96 KG/M2 | WEIGHT: 190 LBS | OXYGEN SATURATION: 94 % | HEIGHT: 62 IN | SYSTOLIC BLOOD PRESSURE: 145 MMHG | TEMPERATURE: 98.7 F

## 2021-08-23 LAB
INR PPP: 2.1 (ref 0.9–1.1)
PROTHROMBIN TIME: 23.2 SEC (ref 11.9–14.7)

## 2021-08-23 PROCEDURE — 74011636637 HC RX REV CODE- 636/637: Performed by: INTERNAL MEDICINE

## 2021-08-23 PROCEDURE — 36415 COLL VENOUS BLD VENIPUNCTURE: CPT

## 2021-08-23 PROCEDURE — 74011250637 HC RX REV CODE- 250/637: Performed by: NURSE PRACTITIONER

## 2021-08-23 PROCEDURE — 85610 PROTHROMBIN TIME: CPT

## 2021-08-23 PROCEDURE — 74011250637 HC RX REV CODE- 250/637: Performed by: INTERNAL MEDICINE

## 2021-08-23 RX ADMIN — BENZONATATE 200 MG: 100 CAPSULE ORAL at 09:10

## 2021-08-23 RX ADMIN — PREDNISONE 2.5 MG: 5 TABLET ORAL at 09:09

## 2021-08-23 RX ADMIN — AMOXICILLIN AND CLAVULANATE POTASSIUM 1 TABLET: 875; 125 TABLET, FILM COATED ORAL at 09:10

## 2021-08-23 RX ADMIN — DULOXETINE HYDROCHLORIDE 60 MG: 30 CAPSULE, DELAYED RELEASE ORAL at 09:09

## 2021-08-23 RX ADMIN — GUAIFENESIN 1200 MG: 600 TABLET, EXTENDED RELEASE ORAL at 09:10

## 2021-08-23 RX ADMIN — DILTIAZEM HYDROCHLORIDE 240 MG: 120 CAPSULE, COATED, EXTENDED RELEASE ORAL at 09:10

## 2021-08-23 RX ADMIN — LEVOTHYROXINE SODIUM 125 MCG: 0.03 TABLET ORAL at 06:00

## 2021-08-23 RX ADMIN — Medication 10 ML: at 06:01

## 2021-08-23 RX ADMIN — DOXYCYCLINE HYCLATE 100 MG: 100 CAPSULE ORAL at 09:09

## 2021-08-23 RX ADMIN — PROPRANOLOL HYDROCHLORIDE 10 MG: 20 TABLET ORAL at 09:10

## 2021-08-23 NOTE — PROGRESS NOTES
Spiritual Care Assessment/Progress Note  [facility]      NAME: Miguel Cramer      MRN: [id number]  AGE: 80 y.o.  SEX: female  Christian Affiliation: [employer]   Language: English     8/23/2021     Total Time (in minutes): 10     Spiritual Assessment begun in [address] through conversation with:         [x]Patient        [] Family    [] Friend(s)        Reason for Consult: Initial/Spiritual assessment, patient floor     Spiritual beliefs: (Please include comment if needed)     [x] Identifies with a ezio tradition:         [] Supported by a ezio community:            [] Claims no spiritual orientation:           [] Seeking spiritual identity:                [] Adheres to an individual form of spirituality:           [] Not able to assess:                           Identified resources for coping:      [x] Prayer                               [] Music                  [] Guided Imagery     [x] Family/friends                 [] Pet visits     [] Devotional reading                         [] Unknown     [] Other:                                              Interventions offered during this visit: (See comments for more details)    Patient Interventions: Affirmation of ezio, Catharsis/review of pertinent events in supportive environment, Coping skills reviewed/reinforced, Guidance concerning next steps/process to be expected, Initial/Spiritual assessment, patient floor, Iconic (affirming the presence of God/Higher Power)           Plan of Care:     [] Support spiritual and/or cultural needs    [] Support AMD and/or advance care planning process      [] Support grieving process   [] Coordinate Rites and/or Rituals    [] Coordination with community clergy   [] No spiritual needs identified at this time   [] Detailed Plan of Care below (See Comments)  [] Make referral to Music Therapy  [] Make referral to Pet Therapy     [] Make referral to Addiction services  [] Make referral to [facility]  [] Make referral to Spiritual Care Partner  [] No future visits requested        [x] Follow up upon further referrals     Comments: The purpose of the visit was to do a spiritual assessment on the patient. The patient was resting in bed at the time of the visit. She mentioned feeling hopeful that she would be able to go home soon. She shared that she feels better than she did from when she came in. She mentioned having a loving and caring family support system. She shared that their prayers have encouraged her to get better and back home. The patient shared that she finds strength in prayer and that relationship with God is invaluable. The  listened empathically and reflectively as the patient shared meaningfully her story. The  provided the ministry of presence as well. [facility].    can be reached by calling the  at [facility]  [phone]

## 2021-08-23 NOTE — DISCHARGE SUMMARY
Hospitalist Discharge Summary     Patient ID:    Monica Noe  [id number]  75 y.o.  6/6/1933    Admit date: 8/18/2021    Discharge date : 8/23/2021    Chronic Diagnoses:    Problem List as of 8/23/2021 Never Reviewed        Codes Class Noted - Resolved    * (Principal) Hypokalemia ICD-10-CM: E87.6  ICD-9-CM: 276.8  8/19/2021 - Present        Hypomagnesemia ICD-10-CM: E83.42  ICD-9-CM: 275.2  8/19/2021 - Present        Pneumonia ICD-10-CM: J18.9  ICD-9-CM: 714  8/19/2021 - Present        Hyponatremia ICD-10-CM: E87.1  ICD-9-CM: 276.1  8/19/2021 - Present        Abnormal EKG ICD-10-CM: R94.31  ICD-9-CM: 794.31  8/19/2021 - Present          22    Final Diagnoses:   Principal Problem:    Hypokalemia (8/19/2021)    Active Problems:    Hypomagnesemia (8/19/2021)      Pneumonia (8/19/2021)      Hyponatremia (8/19/2021)      Abnormal EKG (8/19/2021)        Reason for Hospitalization/Hospital Course:   Patient is 77-year-old female with a PMH significant for atrial fibrillation on Coumadin, hypothyroidism, essential tremor, fibromyalgia/steroid-dependent. She comes in to the emergency room with shortness of breath with associated productive cough, nasal congestion, rhinorrhea and weakness. On examination she is hypoxic 88% on room air placed on 3 L nasal cannula. X-ray reveals bibasilar opacities suspicious for pneumonia. COVID-19 PCR negative. Significant labs leukocytosis, hypokalemia of 2.9 and hypomagnesium of 0.8. Admitted for further management of community-acquired pneumonia, hypokalemia hypomagnesium. Having urinary retention Gray catheter was placed. Started on IV mag and potassium replacement. IV ceftriaxone and doxycycline initiated. Patient has done well, weaned off oxygen saturations are 92% while ambulating, not qualifying for home O2. We have transitioned her from IV to oral antibiotics. She desires to go home will discharge home with follow-up with her PCP in 1 week. Per family request will discharge to SNF for further rehab. Discharge Medications:   Current Discharge Medication List      START taking these medications    Details   doxycycline (VIBRAMYCIN) 100 mg capsule Take 1 Capsule by mouth every twelve (12) hours for 5 days. Qty: 10 Capsule, Refills: 0  Start date: 8/22/2021, End date: 8/27/2021      benzonatate (TESSALON) 200 mg capsule Take 1 Capsule by mouth three (3) times daily for 7 days. Qty: 21 Capsule, Refills: 0  Start date: 8/22/2021, End date: 8/29/2021      amoxicillin-clavulanate (AUGMENTIN) 875-125 mg per tablet Take 1 Tablet by mouth every twelve (12) hours for 7 doses. Qty: 7 Tablet, Refills: 0  Start date: 8/22/2021, End date: 8/26/2021      guaiFENesin ER (MUCINEX) 1,200 mg Ta12 ER tablet Take 1 Tablet by mouth every twelve (12) hours for 7 days. Qty: 14 Tablet, Refills: 0  Start date: 8/22/2021, End date: 8/29/2021         CONTINUE these medications which have NOT CHANGED    Details   dilTIAZem ER (TIAZAC) 120 mg capsule Take 240 mg by mouth.      levothyroxine (SYNTHROID) 125 mcg tablet Take 125 mcg by mouth. DULoxetine (CYMBALTA) 60 mg capsule Take 60 mg by mouth.      predniSONE (DELTASONE) 2.5 mg tablet Take 2.5 mg by mouth daily. warfarin (COUMADIN) 1 mg tablet       traMADoL (ULTRAM) 50 mg tablet Take 50 mg by mouth every eight (8) hours as needed. propranoloL (INDERAL) 10 mg tablet Take 10 mg by mouth two (2) times a day. ALPRAZolam (XANAX) 0.25 mg tablet Take 0.25 mg by mouth. Start date: 8/18/2021               Follow up Care:    1. Kem Boyd MD in 1-2 weeks. Follow-up Information     Follow up With Specialties Details Why Contact Info    Kem Boyd MD Internal Medicine In 1 week Patient needs to call Dr Serenity Nagel at [phone] for follow up visit in one week. [address] [place] [address]  [phone]              Patient Follow Up Instructions:    Activity: As tolerated  Diet:  Cardiac Diet  Wound Care: None needed     Condition at Discharge:  Stable  __________________________________________________________________    Disposition  SNF placement ____________________________________________________________________    Code Status:  Full Code  ___________________________________________________________________    Discharge Exam:  Patient seen and examined by me on discharge day. Pertinent Findings:  Gen:    Not in distress  Chest: Clear lungs  CVS:   Regular rhythm. No edema  Abd:  Soft, not distended, not tender  Neuro:  Alert        CONSULTATIONS: None    Significant Diagnostic Studies:   Recent Results (from the past 24 hour(s))   PROTHROMBIN TIME + INR    Collection Time: 08/22/21  8:17 AM   Result Value Ref Range    Prothrombin time 21.0 (H) 11.9 - 14.7 sec    INR 1.8 (H) 0.9 - 1.1     CBC WITH AUTOMATED DIFF    Collection Time: 08/22/21  8:17 AM   Result Value Ref Range    WBC 10.6 3.6 - 11.0 K/uL    RBC 3.92 3.80 - 5.20 M/uL    HGB 12.0 11.5 - 16.0 g/dL    HCT 36.9 35.0 - 47.0 %    MCV 94.1 80.0 - 99.0 FL    MCH 30.6 26.0 - 34.0 PG    MCHC 32.5 30.0 - 36.5 g/dL    RDW 12.6 11.5 - 14.5 %    PLATELET 705 711 - 790 K/uL    MPV 10.1 8.9 - 12.9 FL    NRBC 0.0 0.0  WBC    ABSOLUTE NRBC 0.00 0.00 - 0.01 K/uL    NEUTROPHILS 59 32 - 75 %    LYMPHOCYTES 28 12 - 49 %    MONOCYTES 9 5 - 13 %    EOSINOPHILS 2 0 - 7 %    BASOPHILS 1 0 - 1 %    IMMATURE GRANULOCYTES 1 (H) 0 - 0.5 %    ABS. NEUTROPHILS 6.3 1.8 - 8.0 K/UL    ABS. LYMPHOCYTES 2.9 0.8 - 3.5 K/UL    ABS. MONOCYTES 1.0 0.0 - 1.0 K/UL    ABS. EOSINOPHILS 0.2 0.0 - 0.4 K/UL    ABS. BASOPHILS 0.1 0.0 - 0.1 K/UL    ABS. IMM.  GRANS. 0.1 (H) 0.00 - 0.04 K/UL    DF AUTOMATED     METABOLIC PANEL, BASIC    Collection Time: 08/22/21  8:17 AM   Result Value Ref Range    Sodium 138 136 - 145 mmol/L    Potassium 3.8 3.5 - 5.1 mmol/L    Chloride 100 97 - 108 mmol/L    CO2 34 (H) 21 - 32 mmol/L    Anion gap 4 (L) 5 - 15 mmol/L    Glucose 90 65 - 100 mg/dL    BUN 5 (L) 6 - 20 mg/dL    Creatinine 0.52 (L) 0.55 - 1.02 mg/dL    BUN/Creatinine ratio 10 (L) 12 - 20      GFR est AA >60 >60 ml/min/1.73m2    GFR est non-AA >60 >60 ml/min/1.73m2    Calcium 9.0 8.5 - 10.1 mg/dL     XR CHEST PORT   Final Result   Improving right lung aeration. XR CHEST PORT   Final Result   Basilar opacities, nonspecific, but could represent pneumonia in the appropriate   clinical setting. Radiographic follow-up is recommended.           Time spent in direct and indirect care including coordination of services: Greater than 35 minutes    Signed:  Gallo Brownlee NP  8/23/2021  10:19 AM

## 2021-08-23 NOTE — DISCHARGE INSTRUCTIONS
Pneumonia: Care Instructions  Overview     Pneumonia is an infection of the lungs. Most cases are caused by infections from bacteria or viruses. Pneumonia may be mild or very severe. If it is caused by bacteria, you will be treated with antibiotics. It may take a few weeks to a few months to recover fully from pneumonia, depending on how sick you were and whether your overall health is good. Follow-up care is a key part of your treatment and safety. Be sure to make and go to all appointments, and call your doctor if you are having problems. It's also a good idea to know your test results and keep a list of the medicines you take. How can you care for yourself at home? · Take your antibiotics exactly as directed. Do not stop taking the medicine just because you are feeling better. You need to take the full course of antibiotics. · Take your medicines exactly as prescribed. Call your doctor if you think you are having a problem with your medicine. · Get plenty of rest and sleep. You may feel weak and tired for a while, but your energy level will improve with time. · To prevent dehydration, drink plenty of fluids, enough so that your urine is light yellow or clear like water. Choose water and other caffeine-free clear liquids until you feel better. If you have kidney, heart, or liver disease and have to limit fluids, talk with your doctor before you increase the amount of fluids you drink. · Take care of your cough so you can rest. A cough that brings up mucus from your lungs is common with pneumonia. It is one way your body gets rid of the infection. But if coughing keeps you from resting or causes severe fatigue and chest-wall pain, talk to your doctor. Your doctor may suggest that you take a medicine to reduce the cough. · Use a vaporizer or humidifier to add moisture to your bedroom. Follow the directions for cleaning the machine. · Do not smoke or allow others to smoke around you.  Smoke will make your cough last longer. If you need help quitting, talk to your doctor about stop-smoking programs and medicines. These can increase your chances of quitting for good. · Take an over-the-counter pain medicine, such as acetaminophen (Tylenol), ibuprofen (Advil, Motrin), or naproxen (Aleve). Read and follow all instructions on the label. · Do not take two or more pain medicines at the same time unless the doctor told you to. Many pain medicines have acetaminophen, which is Tylenol. Too much acetaminophen (Tylenol) can be harmful. · If you were given a spirometer to measure how well your lungs are working, use it as instructed. This can help your doctor tell how your recovery is going. · To prevent pneumonia in the future, talk to your doctor about getting a flu vaccine (once a year) and a pneumococcal vaccine (one time only for most people). When should you call for help? Call 911 anytime you think you may need emergency care. For example, call if:    · You have severe trouble breathing. Call your doctor now or seek immediate medical care if:    · You cough up dark brown or bloody mucus (sputum).     · You have new or worse trouble breathing.     · You are dizzy or lightheaded, or you feel like you may faint. Watch closely for changes in your health, and be sure to contact your doctor if:    · You have a new or higher fever.     · You are coughing more deeply or more often.     · You are not getting better after 2 days (48 hours).     · You do not get better as expected. Where can you learn more? Go to http://www.[employer].com/  Enter D336 in the search box to learn more about \"Pneumonia: Care Instructions. \"  Current as of: October 26, 2020               Content Version: 12.8  © 0292-8149 [employer]. Care instructions adapted under license by [employer] (which disclaims liability or warranty for this information).  If you have questions about a medical condition or this instruction, always ask your healthcare professional. [place] [address] any warranty or liability for your use of this information

## 2021-08-23 NOTE — ROUTINE PROCESS
Bedside and Verbal shift change report given to DAVID Dimas RN (oncoming nurse) by Summer Gonzalezkeeper (offgoing nurse). Report included the following information SBAR, Kardex, MAR, Accordion, Recent Results, Med Rec Status and Quality Measures.

## 2021-08-23 NOTE — PROGRESS NOTES
RODRIGO Plan:    -[facility]         13:08PM Outbound call to patient's daughter Delon Michaels. @ [phone]. Identified self, role, and nature of the call. Identified self, role, and nature of the call. Informed of acceptance to [facility] pt has received, reviewed, and signed 2nd IM letter informing them of their right to appeal the discharge. Signed copy has been placed on pt bedside chart. TETO Pierce              12:55PM Accepted at [facility]. Patient to go to Room 305. RN to call report to [phone]. TETO Pierce          11:59AM Outbound call to [facility] @ [phone]. No answer will attempt to reach later. TETO Pierce              Message sent to admissions coordinator, at [facility]. Geovanni Billings of message re: acceptance and to inform patient is ready for discharge. Awaiting a response from admissions coordinator. Requires Medicare 2nd IM letter prior to discharge.      TETO Pierce

## 2021-08-24 NOTE — PROGRESS NOTES
Pt stable, A/O x3 and ambulatory. Denies pain or discomfort. Being discharged to [facility] [place] [facility]"" today. Daughter, Joanna Found here for pickup. Discharge instructions given and accepted.

## 2021-10-25 LAB — AMB DEXA, EXTERNAL: NORMAL

## 2021-11-03 ENCOUNTER — TRANSCRIBE ORDER (OUTPATIENT)
Dept: SCHEDULING | Age: 86
End: 2021-11-03

## 2021-11-03 DIAGNOSIS — Z12.31 VISIT FOR SCREENING MAMMOGRAM: Primary | ICD-10-CM

## 2021-12-07 ENCOUNTER — HOSPITAL ENCOUNTER (OUTPATIENT)
Dept: MAMMOGRAPHY | Age: 86
Discharge: HOME | End: 2021-12-07
Attending: INTERNAL MEDICINE
Payer: MEDICARE

## 2021-12-07 DIAGNOSIS — Z12.31 VISIT FOR SCREENING MAMMOGRAM: ICD-10-CM

## 2021-12-07 PROCEDURE — 77063 BREAST TOMOSYNTHESIS BI: CPT

## 2021-12-21 ENCOUNTER — TRANSCRIBE ORDER (OUTPATIENT)
Dept: SCHEDULING | Age: 86
End: 2021-12-21

## 2021-12-21 DIAGNOSIS — R92.8 ABNORMAL MAMMOGRAM: Primary | ICD-10-CM

## 2022-01-21 ENCOUNTER — HOSPITAL ENCOUNTER (OUTPATIENT)
Dept: MAMMOGRAPHY | Age: 87
Discharge: HOME | End: 2022-01-21
Attending: INTERNAL MEDICINE
Payer: MEDICARE

## 2022-01-21 DIAGNOSIS — R92.8 ABNORMAL MAMMOGRAM: ICD-10-CM

## 2022-01-21 PROCEDURE — 76642 ULTRASOUND BREAST LIMITED: CPT

## 2022-01-21 PROCEDURE — 77061 BREAST TOMOSYNTHESIS UNI: CPT

## 2022-02-03 ENCOUNTER — HOSPITAL ENCOUNTER (OUTPATIENT)
Dept: MAMMOGRAPHY | Age: 87
Discharge: HOME | End: 2022-02-03
Attending: INTERNAL MEDICINE
Payer: MEDICARE

## 2022-02-03 ENCOUNTER — TRANSCRIBE ORDER (OUTPATIENT)
Dept: REGISTRATION | Age: 87
End: 2022-02-03

## 2022-02-03 ENCOUNTER — HOSPITAL ENCOUNTER (OUTPATIENT)
Dept: LAB | Age: 87
Discharge: HOME | End: 2022-02-03
Attending: INTERNAL MEDICINE
Payer: MEDICARE

## 2022-02-03 DIAGNOSIS — R92.8 ABNORMAL MAMMOGRAM: ICD-10-CM

## 2022-02-03 DIAGNOSIS — I48.91 ATRIAL FIBRILLATION (HCC): ICD-10-CM

## 2022-02-03 DIAGNOSIS — I48.91 ATRIAL FIBRILLATION (HCC): Primary | ICD-10-CM

## 2022-02-03 LAB
BASOPHILS # BLD: 0.1 K/UL (ref 0–0.1)
BASOPHILS NFR BLD: 1 % (ref 0–1)
DIFFERENTIAL METHOD BLD: ABNORMAL
EOSINOPHIL # BLD: 0.3 K/UL (ref 0–0.4)
EOSINOPHIL NFR BLD: 2 % (ref 0–7)
ERYTHROCYTE [DISTWIDTH] IN BLOOD BY AUTOMATED COUNT: 13.4 % (ref 11.5–14.5)
HCT VFR BLD AUTO: 40.2 % (ref 35–47)
HGB BLD-MCNC: 12.7 G/DL (ref 11.5–16)
IMM GRANULOCYTES # BLD AUTO: 0.1 K/UL (ref 0–0.04)
IMM GRANULOCYTES NFR BLD AUTO: 1 % (ref 0–0.5)
INR PPP: 1.3 (ref 0.9–1.1)
LYMPHOCYTES # BLD: 2.9 K/UL (ref 0.8–3.5)
LYMPHOCYTES NFR BLD: 26 % (ref 12–49)
MCH RBC QN AUTO: 29.5 PG (ref 26–34)
MCHC RBC AUTO-ENTMCNC: 31.6 G/DL (ref 30–36.5)
MCV RBC AUTO: 93.5 FL (ref 80–99)
MONOCYTES # BLD: 0.9 K/UL (ref 0–1)
MONOCYTES NFR BLD: 8 % (ref 5–13)
NEUTS SEG # BLD: 6.8 K/UL (ref 1.8–8)
NEUTS SEG NFR BLD: 62 % (ref 32–75)
NRBC # BLD: 0 K/UL (ref 0–0.01)
NRBC BLD-RTO: 0 PER 100 WBC
PLATELET # BLD AUTO: 291 K/UL (ref 150–400)
PMV BLD AUTO: 9.5 FL (ref 8.9–12.9)
PROTHROMBIN TIME: 16 SEC (ref 11.9–14.7)
RBC # BLD AUTO: 4.3 M/UL (ref 3.8–5.2)
WBC # BLD AUTO: 11 K/UL (ref 3.6–11)

## 2022-02-03 PROCEDURE — 77061 BREAST TOMOSYNTHESIS UNI: CPT

## 2022-02-03 PROCEDURE — 19083 BX BREAST 1ST LESION US IMAG: CPT

## 2022-02-03 PROCEDURE — 36415 COLL VENOUS BLD VENIPUNCTURE: CPT

## 2022-02-03 PROCEDURE — 85610 PROTHROMBIN TIME: CPT

## 2022-02-03 PROCEDURE — 85025 COMPLETE CBC W/AUTO DIFF WBC: CPT

## 2022-02-03 PROCEDURE — 88305 TISSUE EXAM BY PATHOLOGIST: CPT

## 2022-02-03 PROCEDURE — 88360 TUMOR IMMUNOHISTOCHEM/MANUAL: CPT

## 2022-02-16 ENCOUNTER — NURSE NAVIGATOR (OUTPATIENT)
Dept: CASE MANAGEMENT | Age: 87
End: 2022-02-16

## 2022-03-19 PROBLEM — J18.9 PNEUMONIA: Status: ACTIVE | Noted: 2021-08-19

## 2022-03-19 PROBLEM — E87.1 HYPONATREMIA: Status: ACTIVE | Noted: 2021-08-19

## 2022-03-19 PROBLEM — E87.6 HYPOKALEMIA: Status: ACTIVE | Noted: 2021-08-19

## 2022-03-19 PROBLEM — E83.42 HYPOMAGNESEMIA: Status: ACTIVE | Noted: 2021-08-19

## 2022-03-19 PROBLEM — R94.31 ABNORMAL EKG: Status: ACTIVE | Noted: 2021-08-19

## 2022-03-25 ENCOUNTER — TRANSCRIBE ORDER (OUTPATIENT)
Dept: SCHEDULING | Age: 87
End: 2022-03-25

## 2022-03-25 DIAGNOSIS — R92.8 ABNORMAL MAMMOGRAM: ICD-10-CM

## 2022-03-25 DIAGNOSIS — C50.911 MALIGNANT NEOPLASM OF RIGHT BREAST (HCC): Primary | ICD-10-CM

## 2022-04-11 ENCOUNTER — TRANSCRIBE ORDER (OUTPATIENT)
Dept: REGISTRATION | Age: 87
End: 2022-04-11

## 2022-04-11 ENCOUNTER — HOSPITAL ENCOUNTER (OUTPATIENT)
Dept: MAMMOGRAPHY | Age: 87
Discharge: HOME | End: 2022-04-11
Payer: MEDICARE

## 2022-04-11 ENCOUNTER — HOSPITAL ENCOUNTER (OUTPATIENT)
Dept: LAB | Age: 87
Discharge: HOME | End: 2022-04-11
Payer: MEDICARE

## 2022-04-11 DIAGNOSIS — Z01.812 PRE-PROCEDURE LAB EXAM: Primary | ICD-10-CM

## 2022-04-11 DIAGNOSIS — Z01.812 PRE-PROCEDURE LAB EXAM: ICD-10-CM

## 2022-04-11 DIAGNOSIS — R92.8 ABNORMAL MAMMOGRAM: ICD-10-CM

## 2022-04-11 DIAGNOSIS — C50.911 MALIGNANT NEOPLASM OF RIGHT BREAST (HCC): ICD-10-CM

## 2022-04-11 LAB
BASOPHILS # BLD: 0.1 K/UL (ref 0–0.1)
BASOPHILS NFR BLD: 0 % (ref 0–1)
DIFFERENTIAL METHOD BLD: ABNORMAL
EOSINOPHIL # BLD: 0.3 K/UL (ref 0–0.4)
EOSINOPHIL NFR BLD: 2 % (ref 0–7)
ERYTHROCYTE [DISTWIDTH] IN BLOOD BY AUTOMATED COUNT: 13.4 % (ref 11.5–14.5)
HCT VFR BLD AUTO: 41.5 % (ref 35–47)
HGB BLD-MCNC: 13.4 G/DL (ref 11.5–16)
IMM GRANULOCYTES # BLD AUTO: 0.1 K/UL (ref 0–0.04)
IMM GRANULOCYTES NFR BLD AUTO: 1 % (ref 0–0.5)
INR PPP: 1.1 (ref 0.9–1.1)
LYMPHOCYTES # BLD: 3.7 K/UL (ref 0.8–3.5)
LYMPHOCYTES NFR BLD: 27 % (ref 12–49)
MCH RBC QN AUTO: 30.1 PG (ref 26–34)
MCHC RBC AUTO-ENTMCNC: 32.3 G/DL (ref 30–36.5)
MCV RBC AUTO: 93.3 FL (ref 80–99)
MONOCYTES # BLD: 1.2 K/UL (ref 0–1)
MONOCYTES NFR BLD: 9 % (ref 5–13)
NEUTS SEG # BLD: 8.4 K/UL (ref 1.8–8)
NEUTS SEG NFR BLD: 61 % (ref 32–75)
NRBC # BLD: 0 K/UL (ref 0–0.01)
NRBC BLD-RTO: 0 PER 100 WBC
PLATELET # BLD AUTO: 268 K/UL (ref 150–400)
PMV BLD AUTO: 9.6 FL (ref 8.9–12.9)
PROTHROMBIN TIME: 13.9 SEC (ref 11.9–14.6)
RBC # BLD AUTO: 4.45 M/UL (ref 3.8–5.2)
WBC # BLD AUTO: 13.7 K/UL (ref 3.6–11)

## 2022-04-11 PROCEDURE — 88305 TISSUE EXAM BY PATHOLOGIST: CPT

## 2022-04-11 PROCEDURE — 77065 DX MAMMO INCL CAD UNI: CPT

## 2022-04-11 PROCEDURE — 36415 COLL VENOUS BLD VENIPUNCTURE: CPT

## 2022-04-11 PROCEDURE — 85610 PROTHROMBIN TIME: CPT

## 2022-04-11 PROCEDURE — 85025 COMPLETE CBC W/AUTO DIFF WBC: CPT

## 2022-04-11 PROCEDURE — 19081 BX BREAST 1ST LESION STRTCTC: CPT

## 2022-04-11 RX ORDER — LIDOCAINE HYDROCHLORIDE 10 MG/ML
12 INJECTION INFILTRATION; PERINEURAL
Status: DISCONTINUED | OUTPATIENT
Start: 2022-04-11 | End: 2022-04-12 | Stop reason: HOSPADM

## 2022-04-11 RX ORDER — LIDOCAINE HYDROCHLORIDE AND EPINEPHRINE 10; 10 MG/ML; UG/ML
40 INJECTION, SOLUTION INFILTRATION; PERINEURAL
Status: DISCONTINUED | OUTPATIENT
Start: 2022-04-11 | End: 2022-04-12 | Stop reason: HOSPADM

## 2022-04-19 ENCOUNTER — TRANSCRIBE ORDER (OUTPATIENT)
Dept: SCHEDULING | Age: 87
End: 2022-04-19

## 2022-04-19 DIAGNOSIS — D05.11 INTRADUCTAL CARCINOMA IN SITU OF RIGHT BREAST: Primary | ICD-10-CM

## 2022-04-29 NOTE — NURSE NAVIGATOR
[address]  Breast Navigator Encounter    Name: Maribeth Carolina  Age: 80 y.o.  : 1933  Diagnosis: Right breast IDC; ER+/NC+/HER2-    Encounter type:  [x]Initial Navigator Encounter  []Patient Initiated  []Navigator Follow-up []Pre-op  []Post-op []Check-in Prior to First Treatment []Treatment Modality Change   []Other:     Narrative:   Called pt to introduce self/role of Breast Navigator. Pt states she has had an initial consult with Dr. Anaya Torrez and has an MRI scheduled for . She has a follow-up with Dr. Anaya Torrez on 3/2 to discuss MRI findings and surgical plan. Pt voiced concerns r/t MRI as she is slightly claustrophobic and has not laid on her stomach in several years d/t an umbilical hernia. I encouraged her to discuss this with Dr. Anaya Torrez as an MRI might not be entirely necessary. She said she has antianxiety medication from Dr. Klaudia Callejas and she will take this prior to the MRI to help. She confirmed that her daughter will be driving her to/from appointments. No questions for NN at this time.           Merlin Flattery, BSN, RN, [facility]  Breast Cancer Nurse Navigator    [address]  [address] [place] [address]  W: [phone]  F: [phone]  [email or web address]   Good Help to Those in [facility] EKG not done at pre op visit with PCP, last EKG done noted in Epic 1-14-21, ordered for POLI Nelson

## 2022-11-07 ENCOUNTER — TELEPHONE (OUTPATIENT)
Dept: INTERNAL MEDICINE CLINIC | Age: 87
End: 2022-11-07

## 2022-11-07 NOTE — TELEPHONE ENCOUNTER
Spoke to pt's daughter, Tiara Gutiérrez and made her aware of Dr. Dennis Spotted recommendations.     Darreld Alex

## 2022-11-07 NOTE — TELEPHONE ENCOUNTER
Patient is calling to find out the results of the blood work that was done last Monday.     Please call

## 2022-11-09 ENCOUNTER — TELEPHONE (OUTPATIENT)
Dept: INTERNAL MEDICINE CLINIC | Age: 87
End: 2022-11-09

## 2022-11-24 DIAGNOSIS — R25.9 ABNORMAL INVOLUNTARY MOVEMENTS: ICD-10-CM

## 2022-11-24 DIAGNOSIS — R25.1 TREMOR, UNSPECIFIED: ICD-10-CM

## 2022-11-24 RX ORDER — PROPRANOLOL HYDROCHLORIDE 10 MG/1
TABLET ORAL
Qty: 180 TABLET | Refills: 1 | Status: SHIPPED | OUTPATIENT
Start: 2022-11-24

## 2022-12-02 RX ORDER — WARFARIN 1 MG/1
TABLET ORAL
Status: CANCELLED | OUTPATIENT
Start: 2022-12-02

## 2022-12-06 RX ORDER — DULOXETIN HYDROCHLORIDE 60 MG/1
60 CAPSULE, DELAYED RELEASE ORAL DAILY
Qty: 90 CAPSULE | Refills: 1 | Status: SHIPPED | OUTPATIENT
Start: 2022-12-06

## 2022-12-06 RX ORDER — LEVOTHYROXINE SODIUM 125 UG/1
TABLET ORAL
Qty: 90 TABLET | Refills: 1 | Status: SHIPPED | OUTPATIENT
Start: 2022-12-06

## 2022-12-07 RX ORDER — WARFARIN 1 MG/1
1 TABLET ORAL DAILY
Qty: 90 TABLET | Refills: 2 | Status: SHIPPED | OUTPATIENT
Start: 2022-12-07

## 2022-12-20 ENCOUNTER — TELEPHONE (OUTPATIENT)
Dept: INTERNAL MEDICINE CLINIC | Age: 87
End: 2022-12-20

## 2022-12-20 DIAGNOSIS — D72.829 LEUKOCYTOSIS, UNSPECIFIED TYPE: Primary | ICD-10-CM

## 2022-12-20 DIAGNOSIS — E78.2 MIXED HYPERLIPIDEMIA: ICD-10-CM

## 2022-12-20 DIAGNOSIS — E03.8 OTHER SPECIFIED HYPOTHYROIDISM: ICD-10-CM

## 2022-12-20 RX ORDER — BUSPIRONE HYDROCHLORIDE 5 MG/1
5 TABLET ORAL 2 TIMES DAILY
Qty: 180 TABLET | Refills: 0 | Status: SHIPPED | OUTPATIENT
Start: 2022-12-20

## 2023-01-09 RX ORDER — WARFARIN 1 MG/1
1 TABLET ORAL 3 TIMES DAILY
COMMUNITY
End: 2023-01-09 | Stop reason: SDUPTHER

## 2023-01-10 LAB
CREATININE, EXTERNAL: 0.91
LDL-C, EXTERNAL: 140
TOTAL CHOLESTEROL, NCHOLT: 219

## 2023-01-16 ENCOUNTER — TELEPHONE (OUTPATIENT)
Dept: INTERNAL MEDICINE CLINIC | Age: 88
End: 2023-01-16

## 2023-01-16 NOTE — TELEPHONE ENCOUNTER
----- Message from Ramses Diaz MD sent at 1/14/2023  6:24 PM EST -----  Continue same dose. Recheck in 1 week.

## 2023-01-17 RX ORDER — CYANOCOBALAMIN 1000 UG/ML
1000 INJECTION, SOLUTION INTRAMUSCULAR; SUBCUTANEOUS
Qty: 1 ML | Refills: 2
Start: 2023-01-17

## 2023-01-17 RX ORDER — CYANOCOBALAMIN 1000 UG/ML
1 INJECTION, SOLUTION INTRAMUSCULAR; SUBCUTANEOUS
COMMUNITY
Start: 2022-10-22 | End: 2023-01-17 | Stop reason: SDUPTHER

## 2023-01-17 NOTE — TELEPHONE ENCOUNTER
Pt is in the process of transferring from [facility] to [facility] and needs Rx sent for her B-12 injections.  Trang King

## 2023-01-18 RX ORDER — WARFARIN 1 MG/1
3 TABLET ORAL DAILY
Qty: 270 TABLET | Refills: 3 | OUTPATIENT
Start: 2023-01-18

## 2023-01-21 RX ORDER — CYANOCOBALAMIN 1000 UG/ML
INJECTION, SOLUTION INTRAMUSCULAR; SUBCUTANEOUS
Qty: 1 ML | Refills: 2 | OUTPATIENT
Start: 2023-01-21

## 2023-01-27 ENCOUNTER — TELEPHONE (OUTPATIENT)
Dept: INTERNAL MEDICINE CLINIC | Age: 88
End: 2023-01-27

## 2023-01-27 NOTE — TELEPHONE ENCOUNTER
----- Message from Rafa Moreira MD sent at 1/26/2023  8:58 PM EST -----  To continue same Coumadin and recheck in 2 weeks

## 2023-02-08 LAB
INR PPP: 1.8 (ref 0.9–1.2)
PROTHROMBIN TIME: 18.2 SEC (ref 9.1–12)

## 2023-02-13 DIAGNOSIS — M50.30 DDD (DEGENERATIVE DISC DISEASE), CERVICAL: ICD-10-CM

## 2023-02-13 DIAGNOSIS — E03.9 HYPOTHYROIDISM, UNSPECIFIED TYPE: ICD-10-CM

## 2023-02-13 DIAGNOSIS — E03.9 HYPOTHYROIDISM, ADULT: ICD-10-CM

## 2023-02-13 DIAGNOSIS — C50.911 INVASIVE DUCTAL CARCINOMA OF BREAST, RIGHT (HCC): ICD-10-CM

## 2023-02-13 DIAGNOSIS — A41.51 ESCHERICHIA COLI SEPSIS (HCC): ICD-10-CM

## 2023-02-13 DIAGNOSIS — M79.7 FIBROMYALGIA: ICD-10-CM

## 2023-02-13 DIAGNOSIS — R16.1 SPLENIC MASS: ICD-10-CM

## 2023-02-13 DIAGNOSIS — I48.91 ATRIAL FIBRILLATION WITH CONTROLLED VENTRICULAR RESPONSE (HCC): Primary | ICD-10-CM

## 2023-02-13 DIAGNOSIS — D51.0 PERNICIOUS ANEMIA: ICD-10-CM

## 2023-02-13 DIAGNOSIS — M51.37 DDD (DEGENERATIVE DISC DISEASE), LUMBOSACRAL: ICD-10-CM

## 2023-02-13 DIAGNOSIS — K42.9 UMBILICAL HERNIA WITHOUT OBSTRUCTION OR GANGRENE: ICD-10-CM

## 2023-02-13 PROBLEM — M51.379 DDD (DEGENERATIVE DISC DISEASE), LUMBOSACRAL: Status: ACTIVE | Noted: 2023-02-13

## 2023-02-13 PROBLEM — J18.9 COMMUNITY ACQUIRED BILATERAL LOWER LOBE PNEUMONIA: Status: ACTIVE | Noted: 2021-08-19

## 2023-02-13 RX ORDER — VITAMIN E 268 MG
400 CAPSULE ORAL DAILY
COMMUNITY

## 2023-02-13 RX ORDER — HYDROGEN PEROXIDE 3 %
20 SOLUTION, NON-ORAL MISCELLANEOUS DAILY
COMMUNITY

## 2023-02-13 RX ORDER — FERROUS SULFATE, DRIED 160(50) MG
1 TABLET, EXTENDED RELEASE ORAL DAILY
COMMUNITY

## 2023-02-13 RX ORDER — GLUCOSAM/CHONDRO/HERB 149/HYAL 750-100 MG
1 TABLET ORAL DAILY
COMMUNITY

## 2023-02-13 RX ORDER — ANASTROZOLE 1 MG/1
1 TABLET ORAL DAILY
COMMUNITY
Start: 2023-02-08

## 2023-02-13 RX ORDER — DILTIAZEM HYDROCHLORIDE 120 MG/1
1 CAPSULE, COATED, EXTENDED RELEASE ORAL 2 TIMES DAILY
COMMUNITY
Start: 2022-12-29

## 2023-02-13 RX ORDER — NITROFURANTOIN 25; 75 MG/1; MG/1
100 CAPSULE ORAL 2 TIMES DAILY
COMMUNITY

## 2023-02-13 RX ORDER — MECLIZINE HCL 12.5 MG 12.5 MG/1
12.5 TABLET ORAL
COMMUNITY

## 2023-02-22 LAB
INR PPP: 1.7 (ref 0.9–1.2)
PROTHROMBIN TIME: 17.8 SEC (ref 9.1–12)

## 2023-02-24 ENCOUNTER — OFFICE VISIT (OUTPATIENT)
Dept: INTERNAL MEDICINE CLINIC | Age: 88
End: 2023-02-24
Payer: MEDICARE

## 2023-02-24 VITALS
HEART RATE: 92 BPM | SYSTOLIC BLOOD PRESSURE: 110 MMHG | BODY MASS INDEX: 33.38 KG/M2 | TEMPERATURE: 98 F | OXYGEN SATURATION: 95 % | DIASTOLIC BLOOD PRESSURE: 70 MMHG | HEIGHT: 63 IN | WEIGHT: 188.4 LBS

## 2023-02-24 DIAGNOSIS — E78.2 MIXED HYPERLIPIDEMIA: ICD-10-CM

## 2023-02-24 DIAGNOSIS — E03.9 HYPOTHYROIDISM, ADULT: Primary | ICD-10-CM

## 2023-02-24 DIAGNOSIS — R73.01 ELEVATED FASTING GLUCOSE: ICD-10-CM

## 2023-02-24 DIAGNOSIS — E05.90 HYPERTHYROIDISM: ICD-10-CM

## 2023-02-24 DIAGNOSIS — D51.0 PERNICIOUS ANEMIA: ICD-10-CM

## 2023-02-24 DIAGNOSIS — C50.911 INVASIVE DUCTAL CARCINOMA OF BREAST, RIGHT (HCC): ICD-10-CM

## 2023-02-24 DIAGNOSIS — I48.0 PAROXYSMAL ATRIAL FIBRILLATION (HCC): ICD-10-CM

## 2023-02-24 DIAGNOSIS — R25.1 TREMOR: ICD-10-CM

## 2023-02-24 PROCEDURE — 99214 OFFICE O/P EST MOD 30 MIN: CPT | Performed by: INTERNAL MEDICINE

## 2023-02-24 PROCEDURE — 1090F PRES/ABSN URINE INCON ASSESS: CPT | Performed by: INTERNAL MEDICINE

## 2023-02-24 PROCEDURE — G8510 SCR DEP NEG, NO PLAN REQD: HCPCS | Performed by: INTERNAL MEDICINE

## 2023-02-24 PROCEDURE — G8536 NO DOC ELDER MAL SCRN: HCPCS | Performed by: INTERNAL MEDICINE

## 2023-02-24 PROCEDURE — G8417 CALC BMI ABV UP PARAM F/U: HCPCS | Performed by: INTERNAL MEDICINE

## 2023-02-24 PROCEDURE — 1101F PT FALLS ASSESS-DOCD LE1/YR: CPT | Performed by: INTERNAL MEDICINE

## 2023-02-24 PROCEDURE — 1123F ACP DISCUSS/DSCN MKR DOCD: CPT | Performed by: INTERNAL MEDICINE

## 2023-02-24 PROCEDURE — G8427 DOCREV CUR MEDS BY ELIG CLIN: HCPCS | Performed by: INTERNAL MEDICINE

## 2023-02-24 RX ORDER — ZOSTER VACCINE RECOMBINANT, ADJUVANTED 50 MCG/0.5
0.5 KIT INTRAMUSCULAR ONCE
Qty: 1 EACH | Refills: 1 | Status: SHIPPED | OUTPATIENT
Start: 2023-02-24 | End: 2023-02-24

## 2023-02-24 RX ORDER — CYANOCOBALAMIN 1000 UG/ML
1000 INJECTION, SOLUTION INTRAMUSCULAR; SUBCUTANEOUS
Qty: 3 ML | Refills: 3
Start: 2023-02-24

## 2023-02-24 NOTE — PROGRESS NOTES
[address] [facility]  [address]  [place], [address] [facility]  Phone: [facility] (: 1933) is a 80 y.o. female, established patient, here for evaluation of the following chief complaint(s):  No chief complaint on file. SUBJECTIVE/OBJECTIVE:  HPI:  Patient is here for her 5 month follow up. Patient states that she gets exercises at the [address] [facility] twice a week. She states that over all she is feeling good and she is getting an average of 8 hours or more. She is still very active and cleans her house,does washing,takes her trash out. .She denies any hospital,  ER or urgent care visits since her last appointment. She needs a refill on B12 injection and would like to get 3 month supply. She is compliant with her medications. Had  follow-up with Dr. Jim Rodrigues in August and has follow-up with Dr. Chloé Lanier  in April. Patient is with her daughter Sangeeta Cotto. Is taking thyroid medicine 1 tablet every day in the morning. Prior to Admission medications    Medication Sig Start Date End Date Taking? Authorizing Provider   cyanocobalamin (VITAMIN B12) 1,000 mcg/mL injection 1 mL by SubCUTAneous route every thirty (30) days. 23  Yes Pooja Kulkarni MD   diph,César Zhao,,Tet Vac-PF (ADACEL) 2 Lf-(2.5-5-3-5 mcg)-5Lf/0.5 mL susp 0.5 mL by IntraMUSCular route once for 1 dose. 23 Yes Pooja Kulkarni MD   varicella-zoster recombinant, PF, (Shingrix, PF,) 50 mcg/0.5 mL susr injection 0.5 mL by IntraMUSCular route once for 1 dose. 23 Yes Pooja Kulkarni MD   anastrozole (ARIMIDEX) 1 mg tablet Take 1 Tablet by mouth daily. 23  Yes Provider, Historical   dilTIAZem ER (CARDIZEM CD) 120 mg capsule Take 1 Capsule by mouth two (2) times a day. 22  Yes Provider, Historical   meclizine (ANTIVERT) 12.5 mg tablet Take 12.5 mg by mouth two (2) times daily as needed for Dizziness.    Yes Provider, Historical vitamin E (AQUA GEMS) 268 mg (400 unit) capsule Take 400 Units by mouth daily. Yes Provider, Historical   calcium-vitamin D (OS-SHAWNEE +D3) 500 mg-200 unit per tablet Take 1 Tablet by mouth daily. Yes Provider, Historical   esomeprazole (NEXIUM) 20 mg capsule Take 20 mg by mouth daily. Yes Provider, Historical   warfarin (COUMADIN) 1 mg tablet Take 3 Tablets by mouth daily. 1/9/23  Yes Eddie Cai MD   busPIRone (BUSPAR) 5 mg tablet Take 1 Tablet by mouth two (2) times a day. 12/20/22  Yes Eddie Cai MD   DULoxetine (CYMBALTA) 60 mg capsule Take 1 Capsule by mouth daily. 12/6/22  Yes Eddie Cai MD   levothyroxine (SYNTHROID) 125 mcg tablet TAKE 1 TABLET BY MOUTH IN  THE MORNING 12/6/22  Yes Eddie Cai MD   propranoloL (INDERAL) 10 mg tablet TAKE 1 TABLET BY MOUTH TWICE A DAY 11/24/22  Yes Eddie Cai MD   predniSONE (DELTASONE) 2.5 mg tablet Take 2.5 mg by mouth daily. 6/3/21  Yes Provider, Historical   omega 3-DHA-EPA-fish oil (Fish OiL) 1,000 mg (120 mg-180 mg) capsule Take 1 Capsule by mouth daily.     Provider, Historical        Allergies   Allergen Reactions    Codeine Hives        Past Medical History:   Diagnosis Date    Atrial fibrillation ([address]     Atrial fibrillation with controlled ventricular response ([address]     Community acquired bilateral lower lobe pneumonia     DDD (degenerative disc disease), cervical     DDD (degenerative disc disease), lumbosacral     Escherichia coli sepsis (HCC)     Fibromyalgia     Hypomagnesemia     Hypothyroid     Hypothyroidism, adult     Invasive ductal carcinoma of breast, right (HCC)     Osteoarthritis of cervical spine, unspecified spinal osteoarthritis complication status     Pernicious anemia     Splenic mass     Umbilical hernia without obstruction or gangrene         Family History   Problem Relation Age of Onset    Breast Cancer Sister     Cancer Brother     Kidney Disease Other         Past Surgical History:   Procedure Laterality Date    HX APPENDECTOMY      HX BREAST BIOPSY Left     benign    HX CHOLECYSTECTOMY      HX HYSTERECTOMY      age 40    HX HYSTERECTOMY  1970       Review of Systems   Constitutional:  Negative for chills and fever. HENT:  Negative for congestion, ear pain, nosebleeds, sinus pain, sore throat and tinnitus. Eyes:  Negative for redness. Respiratory:  Negative for cough and shortness of breath. Cardiovascular:  Negative for chest pain and palpitations. Gastrointestinal:  Negative for abdominal pain, diarrhea, nausea and vomiting. Endocrine: Negative for cold intolerance and polyuria. Genitourinary:  Negative for dysuria and hematuria. Musculoskeletal:  Negative for back pain and neck pain. Skin:  Negative for rash. Neurological:  Negative for dizziness and headaches. Psychiatric/Behavioral: Negative. /70   Pulse 92   Temp 98 °F (36.7 °C)   Ht 5' 3\" (1.6 m)   Wt 188 lb 6.4 oz (85.5 kg)   SpO2 95%   BMI 33.37 kg/m²      Physical Exam  Constitutional:       Appearance: Elderly patient with her daughter Garrick Gutiérrez. HENT:      Head: Normocephalic and atraumatic. Right Ear: External ear normal.      Left Ear: External ear normal.      Nose: Nose normal.      Mouth/Throat:      Mouth: Mucous membranes are moist.   Eyes:      Extraocular Movements: Extraocular movements intact. Pupils: Pupils are equal, round, and reactive to light. Cardiovascular:      Rate and Rhythm: Normal rate and regular rhythm. Pulmonary:      Effort: Pulmonary effort is normal.      Breath sounds: Normal breath sounds. Abdominal:      Palpations: Abdomen is soft. Tenderness: There is no abdominal tenderness. Musculoskeletal:      Cervical back: Normal range of motion and neck supple. Right lower leg: No edema. Left lower leg: No edema. Patient walks with her walker  Skin:     General: Skin is warm and dry. Neurological:      General: No focal deficit present. Mental Status: He is alert and oriented to person, place, and time. Intermittent tremor of her right forearm and hand. Psychiatric:         Mood and Affect: Mood normal.    ASSESSMENT/PLAN:  Below is the assessment and plan developed based on review of pertinent history, physical exam, labs, studies, and medications. 1. Hypothyroidism, adult  Comments:  TSH  is low at 0.183, will change levothyroxine to 1 tab of levothyroxine daily for 6 days and half tablet on the seventh day, recheck TSH in 2 months  Orders:  -     TSH 3RD GENERATION; Future  -     TSH 3RD GENERATION; Future  2. Paroxysmal atrial fibrillation (HCC)  Comments:   INR Is slightly low at 1.7, she is on 3 mg of Coumadin advised to take 4 mg of Coumadin today and then 3 mg daily and recheck in a week, arrange a home INR   Orders:  -     CBC WITH AUTOMATED DIFF; Future  3. Invasive ductal carcinoma of breast, right [facility])  Comments:  Patient preferred no surgery, is on Arimidex has follow-up with oncologist in April 23  [address] [place], [place] [address]way:     4. Pernicious anemia  Comments:  Advised to continue Vitamin B12 every month  5. Hyperthyroidism  Comments:  Due to replacement, to levothyroxine dose has been adjusted  6. Mixed hyperlipidemia  Comments:  Cholesterol is better with a total cholesterol of 219 from 278 in January 21, LDL of 140 from 193 in January 21 triglyceride 175 HDL 48. Advised to continue he  Orders:  -     METABOLIC PANEL, COMPREHENSIVE; Future  -     LIPID PANEL; Future  7. Elevated fasting glucose  Comments:  Glucose is slightly high at 118, BUN 10, creatinine 0.91, potassium 4.3 advised to decrease sweets and carbohydrates  Orders:  -     HEMOGLOBIN A1C WITH EAG; Future  8. Tremor  Comments:  Advised to to continue propranolol    Return in about 6 months (around 8/24/2023), or if symptoms worsen or fail to improve, for For Medicare Physical and labs. There are no Patient Instructions on file for this visit.      Health Maintenance Due   Topic Date Due    Depression Screen  Never done    DTaP/Tdap/Td series (1 - Tdap) Never done    Shingles Vaccine (1 of 2) Never done    Pneumococcal 65+ years (1 - PCV) Never done    Medicare Yearly Exam  Never done        Aspects of this note may have been generated using voice recognition software. Despite editing, there may be unrecognized errors. An electronic signature was used to authenticate this note. -- Adri Taylor MD   1. \"Have you been to the ER, urgent care clinic since your last visit? Hospitalized since your last visit? \" No    2. \"Have you seen or consulted any other health care providers outside of the [address] [place] [address] since your last visit? \" No     3. For patients aged 39-70: Has the patient had a colonoscopy / FIT/ Cologuard? Yes - Care Gap present. Rooming MA/LPN to request most recent results      If the patient is female:    4. For patients aged 41-77: Has the patient had a mammogram within the past 2 years? Yes - Care Gap present. Rooming MA/LPN to request most recent results      5. For patients aged 21-65: Has the patient had a pap smear?  No

## 2023-02-25 NOTE — PROGRESS NOTES
INR report discussed with patient and daughter.   Can you arrange an INR Machine for home INR monitoring

## 2023-03-01 RX ORDER — DULOXETIN HYDROCHLORIDE 60 MG/1
CAPSULE, DELAYED RELEASE ORAL
Qty: 180 CAPSULE | Refills: 3 | Status: SHIPPED | OUTPATIENT
Start: 2023-03-01

## 2023-03-08 LAB
INR PPP: 1.4 (ref 0.9–1.2)
PROTHROMBIN TIME: 14.7 SEC (ref 9.1–12)

## 2023-03-15 LAB
INR PPP: 1.5 (ref 0.9–1.2)
PROTHROMBIN TIME: 15.9 SEC (ref 9.1–12)

## 2023-03-17 ENCOUNTER — TELEPHONE (OUTPATIENT)
Dept: INTERNAL MEDICINE CLINIC | Age: 88
End: 2023-03-17

## 2023-03-17 NOTE — TELEPHONE ENCOUNTER
INR 1.5, she is on 5mg x 2 days and then 3mg daily, She was advised to take 5mg x 3 days and then resume 3mg and recheck 1 week. Patient notified. Also advised her to call us the day after she does it.

## 2023-03-20 RX ORDER — CYANOCOBALAMIN 1000 UG/ML
1000 INJECTION, SOLUTION INTRAMUSCULAR; SUBCUTANEOUS
Qty: 3 ML | Refills: 3
Start: 2023-03-20 | End: 2023-03-23 | Stop reason: SDUPTHER

## 2023-03-22 ENCOUNTER — TELEPHONE (OUTPATIENT)
Dept: INTERNAL MEDICINE CLINIC | Age: 88
End: 2023-03-22

## 2023-03-22 LAB
INR PPP: 2.4 (ref 0.9–1.2)
PROTHROMBIN TIME: 24.2 SEC (ref 9.1–12)

## 2023-03-22 NOTE — TELEPHONE ENCOUNTER
Message per call center:    pt is wanting to be seen before May   which is the next available, please advise.  pt states she has no energy   since she stopped taking the B12 due to a problem of not being called in.   shows under medication she was given a refill on 2/24/23 but doesn't state   what pharmacy it was sent to. please advise pt

## 2023-03-23 RX ORDER — CYANOCOBALAMIN 1000 UG/ML
1000 INJECTION, SOLUTION INTRAMUSCULAR; SUBCUTANEOUS
Qty: 3 ML | Refills: 3
Start: 2023-03-23 | End: 2023-03-23 | Stop reason: SDUPTHER

## 2023-03-23 RX ORDER — CYANOCOBALAMIN 1000 UG/ML
1000 INJECTION, SOLUTION INTRAMUSCULAR; SUBCUTANEOUS
Qty: 3 ML | Refills: 3
Start: 2023-03-23 | End: 2023-03-24 | Stop reason: SDUPTHER

## 2023-03-24 RX ORDER — CYANOCOBALAMIN 1000 UG/ML
1000 INJECTION, SOLUTION INTRAMUSCULAR; SUBCUTANEOUS
Qty: 3 ML | Refills: 3
Start: 2023-03-24

## 2023-04-19 LAB
INR PPP: 1.6 (ref 0.9–1.2)
PROTHROMBIN TIME: 16.1 SEC (ref 9.1–12)

## 2023-04-22 DIAGNOSIS — E78.2 MIXED HYPERLIPIDEMIA: Primary | ICD-10-CM

## 2023-04-22 DIAGNOSIS — I48.0 PAROXYSMAL ATRIAL FIBRILLATION (HCC): Primary | ICD-10-CM

## 2023-04-22 DIAGNOSIS — E03.9 HYPOTHYROIDISM, ADULT: Primary | ICD-10-CM

## 2023-04-24 DIAGNOSIS — R73.01 ELEVATED FASTING GLUCOSE: Primary | ICD-10-CM

## 2023-04-24 DIAGNOSIS — E78.2 MIXED HYPERLIPIDEMIA: Primary | ICD-10-CM

## 2023-04-26 LAB
INR PPP: 1.7 (ref 0.9–1.2)
PROTHROMBIN TIME: 17.9 SEC (ref 9.1–12)

## 2023-05-02 LAB
INR PPP: 2.2 (ref 0.9–1.2)
PROTHROMBIN TIME: 20.5 SEC (ref 9.1–12)

## 2023-05-08 RX ORDER — LEVOTHYROXINE SODIUM 0.12 MG/1
TABLET ORAL
Qty: 90 TABLET | Refills: 3 | Status: SHIPPED | OUTPATIENT
Start: 2023-05-08

## 2023-05-16 DIAGNOSIS — I48.91 ATRIAL FIBRILLATION WITH CONTROLLED VENTRICULAR RESPONSE (HCC): ICD-10-CM

## 2023-05-16 DIAGNOSIS — I48.91 ATRIAL FIBRILLATION WITH CONTROLLED VENTRICULAR RESPONSE (HCC): Primary | ICD-10-CM

## 2023-05-17 LAB
INR PPP: 1.9 (ref 0.9–1.2)
PROTHROMBIN TIME: 19.1 SEC (ref 9.1–12)

## 2023-05-31 LAB
INR PPP: 1.5 (ref 0.9–1.2)
PROTHROMBIN TIME: 15.9 SEC (ref 9.1–12)

## 2023-06-02 ENCOUNTER — TELEPHONE (OUTPATIENT)
Facility: CLINIC | Age: 88
End: 2023-06-02

## 2023-06-07 LAB
INR PPP: 1.8 (ref 0.9–1.2)
PROTHROMBIN TIME: 18.6 SEC (ref 9.1–12)

## 2023-06-21 LAB
INR PPP: 1.6 (ref 0.9–1.2)
PROTHROMBIN TIME: 16.7 SEC (ref 9.1–12)

## 2023-06-23 ENCOUNTER — TELEPHONE (OUTPATIENT)
Facility: CLINIC | Age: 88
End: 2023-06-23

## 2023-06-28 LAB
INR PPP: 1.9 (ref 0.9–1.2)
PROTHROMBIN TIME: 19.8 SEC (ref 9.1–12)

## 2023-07-12 LAB
INR PPP: 1.6 (ref 0.9–1.2)
PROTHROMBIN TIME: 16.7 SEC (ref 9.1–12)

## 2023-07-19 ENCOUNTER — NURSE TRIAGE (OUTPATIENT)
Dept: OTHER | Facility: CLINIC | Age: 88
End: 2023-07-19

## 2023-07-19 ENCOUNTER — TELEPHONE (OUTPATIENT)
Facility: CLINIC | Age: 88
End: 2023-07-19

## 2023-07-19 LAB
INR PPP: 1.7 (ref 0.9–1.2)
PROTHROMBIN TIME: 17.5 SEC (ref 9.1–12)

## 2023-07-19 NOTE — TELEPHONE ENCOUNTER
Location of patient: [facility] call from [facility] with [employer]. Subjective: Caller states \"my hip wasn't hurting when I fell but its hurting so bad, I cant lay on my left side\"     Current Symptoms: left sided hip pain    Onset: A couple days ago; worsening    Associated Symptoms: reduced activity, increased wakefulness    Pain Severity: 8/10; throbbing; constant    Temperature: no fevers     What has been tried: heating pain and tylenol    LMP: NA Pregnant: NA    Recommended disposition: Go to [facility]    Care advice provided, patient verbalizes understanding; denies any other questions or concerns; instructed to call back for any new or worsening symptoms. Patient/Caller agrees with recommended disposition; writer provided warm transfer to [facility] for appointment scheduling    Attention Provider: Thank you for allowing me to participate in the care of your patient. The patient was connected to triage in response to information provided to the ECC/PSC. Please do not respond through this encounter as the response is not directed to a shared pool.       Reason for Disposition   SEVERE pain (e.g., excruciating, unable to do any normal activities)    Protocols used: Hip Pain-ADULT-[place]

## 2023-07-25 ENCOUNTER — OFFICE VISIT (OUTPATIENT)
Facility: CLINIC | Age: 88
End: 2023-07-25
Payer: MEDICARE

## 2023-07-25 VITALS
HEART RATE: 82 BPM | DIASTOLIC BLOOD PRESSURE: 73 MMHG | BODY MASS INDEX: 35.12 KG/M2 | HEIGHT: 61 IN | TEMPERATURE: 98 F | WEIGHT: 186 LBS | SYSTOLIC BLOOD PRESSURE: 134 MMHG

## 2023-07-25 DIAGNOSIS — R35.0 FREQUENCY OF MICTURITION: ICD-10-CM

## 2023-07-25 DIAGNOSIS — E03.9 HYPOTHYROIDISM, UNSPECIFIED TYPE: ICD-10-CM

## 2023-07-25 DIAGNOSIS — I48.91 ATRIAL FIBRILLATION WITH CONTROLLED VENTRICULAR RESPONSE (HCC): ICD-10-CM

## 2023-07-25 DIAGNOSIS — Z91.81 AT HIGH RISK FOR FALLS: ICD-10-CM
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
## 2023-07-25 DIAGNOSIS — M25.552 LEFT HIP PAIN: ICD-10-CM

## 2023-07-25 PROCEDURE — 1123F ACP DISCUSS/DSCN MKR DOCD: CPT | Performed by: INTERNAL MEDICINE

## 2023-07-25 PROCEDURE — G8427 DOCREV CUR MEDS BY ELIG CLIN: HCPCS | Performed by: INTERNAL MEDICINE

## 2023-07-25 PROCEDURE — G8417 CALC BMI ABV UP PARAM F/U: HCPCS | Performed by: INTERNAL MEDICINE

## 2023-07-25 PROCEDURE — 1090F PRES/ABSN URINE INCON ASSESS: CPT | Performed by: INTERNAL MEDICINE

## 2023-07-25 PROCEDURE — 99213 OFFICE O/P EST LOW 20 MIN: CPT | Performed by: INTERNAL MEDICINE

## 2023-07-25 PROCEDURE — 1036F TOBACCO NON-USER: CPT | Performed by: INTERNAL MEDICINE

## 2023-07-25 SDOH — ECONOMIC STABILITY: FOOD INSECURITY: WITHIN THE PAST 12 MONTHS, YOU WORRIED THAT YOUR FOOD WOULD RUN OUT BEFORE YOU GOT MONEY TO BUY MORE.: NEVER TRUE

## 2023-07-25 SDOH — ECONOMIC STABILITY: HOUSING INSECURITY
IN THE LAST 12 MONTHS, WAS THERE A TIME WHEN YOU DID NOT HAVE A STEADY PLACE TO SLEEP OR SLEPT IN A SHELTER (INCLUDING NOW)?: NO

## 2023-07-25 SDOH — ECONOMIC STABILITY: INCOME INSECURITY: HOW HARD IS IT FOR YOU TO PAY FOR THE VERY BASICS LIKE FOOD, HOUSING, MEDICAL CARE, AND HEATING?: NOT HARD AT ALL

## 2023-07-25 SDOH — ECONOMIC STABILITY: FOOD INSECURITY: WITHIN THE PAST 12 MONTHS, THE FOOD YOU BOUGHT JUST DIDN'T LAST AND YOU DIDN'T HAVE MONEY TO GET MORE.: NEVER TRUE

## 2023-07-25 NOTE — PROGRESS NOTES
[address] [facility] Internal Medicine  [address] [facility] Juany Harris, [address] [facility]  Phone: [address] (: 1933) is a 80 y.o. female, established patient, here for evaluation of the following chief complaint(s):  Fall (Hip pain)    SUBJECTIVE/OBJECTIVE:  HPI:  Patient is being seen today for a fall on 2023 and UTI symptoms. She states  that she was pushing a cart with trash and front wheel got caught on the  corazon and she fell face down. She states that she fell on her elbows. Patient is having left hip pain from arthritis. Patient states that  when she got up to go to the bathroom she was in a lot of pain and that happened on 2023. Patient says that she started UTI symptoms started a week ago. Patient  states that she talavera not need any refills at this time. She  had blood work done in  with SHEELA Davenport. Has bone density next month with Dr Darnell Alicea and has follow up with Dr Saul Rosales. Is taking 5 mg coumadin daily now and will check INR tomorrow Coumadin. Is on meals on wheels On Mon,Wed,Friday. [place] [employer] [place]       Allergies   Allergen Reactions    Codeine Hives        Past Medical History:   Diagnosis Date    Atrial fibrillation (HCC)     Atrial fibrillation with controlled ventricular response ([address])     Community acquired bilateral lower lobe pneumonia     DDD (degenerative disc disease), cervical     DDD (degenerative disc disease), lumbosacral     Escherichia coli sepsis (HCC)     Fibromyalgia     Hypomagnesemia     Hypothyroid     Hypothyroidism, adult     Invasive ductal carcinoma of breast, right (HCC)     Osteoarthritis of cervical spine, unspecified spinal osteoarthritis complication status     Pernicious anemia     Splenic mass     Umbilical hernia without obstruction or gangrene         Family History   Problem Relation Age of Onset    Breast Cancer Sister     Kidney Disease Other     Cancer Brother

## 2023-07-25 NOTE — PROGRESS NOTES
1. \"Have you been to the ER, urgent care clinic since your last visit? Hospitalized since your last visit? \" No    2. \"Have you seen or consulted any other health care providers outside of the [address] [place] [address] since your last visit? \" SHEELA Latif    3. For patients aged 43-73: Has the patient had a colonoscopy / FIT/ Cologuard? NA - based on age      If the patient is female:    4. For patients aged 43-66: Has the patient had a mammogram within the past 2 years? NA - based on age or sex      11. For patients aged 21-65: Has the patient had a pap smear?  NA - based on age or sex

## 2023-07-27 LAB
INR PPP: 2.2 (ref 0.9–1.2)
PROTHROMBIN TIME: 22.2 SEC (ref 9.1–12)
TSH SERPL DL<=0.005 MIU/L-ACNC: 4.08 UIU/ML (ref 0.45–4.5)

## 2023-07-29 RX ORDER — CYANOCOBALAMIN 1000 UG/ML
INJECTION, SOLUTION INTRAMUSCULAR; SUBCUTANEOUS
Qty: 1 ML | Refills: 2 | Status: SHIPPED | OUTPATIENT
Start: 2023-07-29

## 2023-08-09 ENCOUNTER — TELEPHONE (OUTPATIENT)
Facility: CLINIC | Age: 88
End: 2023-08-09

## 2023-08-09 NOTE — TELEPHONE ENCOUNTER
Patients daughter called and wants to know the results from Xray of the hip and spine. Can you look in media at the results please. She also states that patient is still having a lot of pain and she does not know what to do at this point. The daughter states that the patients anxiety is really really bad and is wondering if there is anything you can send in to help.

## 2023-08-10 RX ORDER — CYANOCOBALAMIN 1000 UG/ML
INJECTION, SOLUTION INTRAMUSCULAR; SUBCUTANEOUS
Qty: 1 ML | Refills: 2 | Status: SHIPPED | OUTPATIENT
Start: 2023-08-10

## 2023-08-10 RX ORDER — PROPRANOLOL HYDROCHLORIDE 10 MG/1
10 TABLET ORAL 2 TIMES DAILY
Qty: 180 TABLET | Refills: 0 | Status: SHIPPED | OUTPATIENT
Start: 2023-08-10

## 2023-08-16 LAB
INR PPP: 2.3 (ref 0.9–1.2)
PROTHROMBIN TIME: 23.6 SEC (ref 9.1–12)

## 2023-08-24 DIAGNOSIS — E78.2 MIXED HYPERLIPIDEMIA: ICD-10-CM

## 2023-08-24 DIAGNOSIS — R73.01 ELEVATED FASTING GLUCOSE: ICD-10-CM

## 2023-08-24 DIAGNOSIS — I48.0 PAROXYSMAL ATRIAL FIBRILLATION (HCC): ICD-10-CM

## 2023-08-24 DIAGNOSIS — E03.9 HYPOTHYROIDISM, ADULT: ICD-10-CM

## 2023-08-25 ENCOUNTER — OFFICE VISIT (OUTPATIENT)
Facility: CLINIC | Age: 88
End: 2023-08-25

## 2023-08-25 ENCOUNTER — TELEPHONE (OUTPATIENT)
Facility: CLINIC | Age: 88
End: 2023-08-25

## 2023-08-25 VITALS
SYSTOLIC BLOOD PRESSURE: 112 MMHG | BODY MASS INDEX: 33.79 KG/M2 | HEART RATE: 78 BPM | HEIGHT: 61 IN | DIASTOLIC BLOOD PRESSURE: 72 MMHG | OXYGEN SATURATION: 97 % | TEMPERATURE: 97.9 F | WEIGHT: 179 LBS

## 2023-08-25 DIAGNOSIS — F41.9 ANXIETY AND DEPRESSION: ICD-10-CM

## 2023-08-25 DIAGNOSIS — D51.0 PERNICIOUS ANEMIA: ICD-10-CM

## 2023-08-25 DIAGNOSIS — Z00.00 MEDICARE ANNUAL WELLNESS VISIT, SUBSEQUENT: Primary | ICD-10-CM

## 2023-08-25 DIAGNOSIS — I48.91 ATRIAL FIBRILLATION, UNSPECIFIED TYPE (HCC): ICD-10-CM

## 2023-08-25 DIAGNOSIS — F32.A ANXIETY AND DEPRESSION: ICD-10-CM

## 2023-08-25 PROBLEM — M25.569 KNEE PAIN: Status: ACTIVE | Noted: 2021-07-02

## 2023-08-25 PROBLEM — C50.911 INVASIVE DUCTAL CARCINOMA OF BREAST, RIGHT (HCC): Status: ACTIVE | Noted: 2023-02-13

## 2023-08-25 PROBLEM — S80.01XA CONTUSION OF RIGHT KNEE: Status: ACTIVE | Noted: 2021-07-02

## 2023-08-25 PROBLEM — E07.9 THYROID DISORDER: Status: ACTIVE | Noted: 2021-07-02

## 2023-08-25 PROBLEM — M79.7 FIBROMYALGIA: Status: ACTIVE | Noted: 2021-07-02

## 2023-08-25 PROBLEM — M75.81 TENDINITIS OF RIGHT ROTATOR CUFF: Status: ACTIVE | Noted: 2021-07-02

## 2023-08-25 RX ORDER — ZOSTER VACCINE RECOMBINANT, ADJUVANTED 50 MCG/0.5
0.5 KIT INTRAMUSCULAR SEE ADMIN INSTRUCTIONS
Qty: 0.5 ML | Refills: 0 | Status: SHIPPED | OUTPATIENT
Start: 2023-08-25 | End: 2024-02-21

## 2023-08-25 RX ORDER — CLONAZEPAM 0.5 MG/1
0.25 TABLET ORAL 2 TIMES DAILY PRN
Qty: 30 TABLET | Refills: 1 | Status: SHIPPED | OUTPATIENT
Start: 2023-08-25 | End: 2023-10-24

## 2023-08-25 ASSESSMENT — PATIENT HEALTH QUESTIONNAIRE - PHQ9
9. THOUGHTS THAT YOU WOULD BE BETTER OFF DEAD, OR OF HURTING YOURSELF: 1
3. TROUBLE FALLING OR STAYING ASLEEP: 3
4. FEELING TIRED OR HAVING LITTLE ENERGY: 3
SUM OF ALL RESPONSES TO PHQ QUESTIONS 1-9: 6
5. POOR APPETITE OR OVEREATING: 3
SUM OF ALL RESPONSES TO PHQ QUESTIONS 1-9: 6
SUM OF ALL RESPONSES TO PHQ9 QUESTIONS 1 & 2: 6
SUM OF ALL RESPONSES TO PHQ QUESTIONS 1-9: 16
SUM OF ALL RESPONSES TO PHQ QUESTIONS 1-9: 6
1. LITTLE INTEREST OR PLEASURE IN DOING THINGS: 3
2. FEELING DOWN, DEPRESSED OR HOPELESS: 3
SUM OF ALL RESPONSES TO PHQ QUESTIONS 1-9: 16
10. IF YOU CHECKED OFF ANY PROBLEMS, HOW DIFFICULT HAVE THESE PROBLEMS MADE IT FOR YOU TO DO YOUR WORK, TAKE CARE OF THINGS AT HOME, OR GET ALONG WITH OTHER PEOPLE: 3
SUM OF ALL RESPONSES TO PHQ QUESTIONS 1-9: 16
7. TROUBLE CONCENTRATING ON THINGS, SUCH AS READING THE NEWSPAPER OR WATCHING TELEVISION: 3
6. FEELING BAD ABOUT YOURSELF - OR THAT YOU ARE A FAILURE OR HAVE LET YOURSELF OR YOUR FAMILY DOWN: 3
8. MOVING OR SPEAKING SO SLOWLY THAT OTHER PEOPLE COULD HAVE NOTICED. OR THE OPPOSITE, BEING SO FIGETY OR RESTLESS THAT YOU HAVE BEEN MOVING AROUND A LOT MORE THAN USUAL: 0
SUM OF ALL RESPONSES TO PHQ QUESTIONS 1-9: 6
SUM OF ALL RESPONSES TO PHQ QUESTIONS 1-9: 15

## 2023-08-25 ASSESSMENT — LIFESTYLE VARIABLES
HOW MANY STANDARD DRINKS CONTAINING ALCOHOL DO YOU HAVE ON A TYPICAL DAY: PATIENT DOES NOT DRINK
HOW OFTEN DO YOU HAVE A DRINK CONTAINING ALCOHOL: NEVER

## 2023-08-25 NOTE — PROGRESS NOTES
Chief Complaint   Patient presents with    Medicare AWV         1. \"Have you been to the ER, urgent care clinic since your last visit? Hospitalized since your last visit? \" No    2. \"Have you seen or consulted any other health care providers outside of the [address] [place] [address] since your last visit? \" No     3. For patients aged 43-73: Has the patient had a colonoscopy / FIT/ Cologuard? NA - based on age      If the patient is female:    4. For patients aged 43-66: Has the patient had a mammogram within the past 2 years? NA - based on age or sex      11. For patients aged 21-65: Has the patient had a pap smear?  NA - based on age or sex
Hypomagnesemia     Hypothyroid     Hypothyroidism, adult     Invasive ductal carcinoma of breast, right (HCC)     Osteoarthritis of cervical spine, unspecified spinal osteoarthritis complication status     Pernicious anemia     Splenic mass     Umbilical hernia without obstruction or gangrene      Family History   Problem Relation Age of Onset    Breast Cancer Sister     Kidney Disease Other     Cancer Brother       Current Outpatient Medications on File Prior to Visit   Medication Sig Dispense Refill    cyanocobalamin 1000 MCG/ML injection INJECT 1ML UNDER THE SKIN ONCE EVERY 30 DAYS 1 mL 2    propranolol (INDERAL) 10 MG tablet TAKE 1 TABLET BY MOUTH 2 TIMES DAILY 180 tablet 0    levothyroxine (SYNTHROID) 125 MCG tablet TAKE 1 TABLET BY MOUTH IN THE  MORNING 90 tablet 3    anastrozole (ARIMIDEX) 1 MG tablet Take 1 tablet by mouth daily      Calcium Carbonate-Vitamin D (OYSTER SHELL CALCIUM/D) 500-5 MG-MCG TABS Take 1 tablet by mouth daily      dilTIAZem (TIAZAC) 120 MG extended release capsule Take 1 capsule by mouth 2 times daily      DULoxetine (CYMBALTA) 60 MG extended release capsule TAKE 1 CAPSULE BY MOUTH  DAILY AND 2ND DOSE AS  NEEDED      esomeprazole (NEXIUM) 20 MG delayed release capsule Take 1 capsule by mouth daily      meclizine (ANTIVERT) 12.5 MG tablet Take 1 tablet by mouth 2 times daily as needed      warfarin (COUMADIN) 1 MG tablet Take 5 tablets by mouth daily       No current facility-administered medications on file prior to visit. Patient's complete Health Risk Assessment and screening values have been reviewed and are found in Flowsheets. The following problems were reviewed today and where indicated follow up appointments were made and/or referrals ordered.     Positive Risk Factor Screenings with Interventions:    Fall Risk:  Do you feel unsteady or are you worried about falling? : (!) yes  2 or more falls in past year?: no  Fall with injury in past year?: (!) yes     Interventions:

## 2023-08-25 NOTE — TELEPHONE ENCOUNTER
Patient daughter called very upset that her mothers Rx was not sent in to [facility]. She stated it was supposed to be sent this morning and her pharmacy closes at 5 PM. They are only open until 1 PM on Saturday. She stated her mother needs this today and it is imperative that you send it today. She stated it was for Xanax.

## 2023-08-28 ENCOUNTER — CLINICAL DOCUMENTATION (OUTPATIENT)
Dept: CASE MANAGEMENT | Age: 88
End: 2023-08-28

## 2023-08-28 ENCOUNTER — TELEPHONE (OUTPATIENT)
Facility: CLINIC | Age: 88
End: 2023-08-28

## 2023-08-28 NOTE — TELEPHONE ENCOUNTER
----- Message from Stefan Life sent at 8/25/2023  1:29 PM EDT -----  Subject: Message to Provider    QUESTIONS  Information for Provider? [facility] is faxing over a request for DME supplies   for pt today; they were calling for an update. Paramjit's direct phone number   if there are any questions? [phone]. The number to fax the completed   form to is the same as Paramjit's phone number. ---------------------------------------------------------------------------  --------------  Tena Bradley INFO  [phone]; OK to leave message on voicemail  ---------------------------------------------------------------------------  --------------  SCRIPT ANSWERS  Relationship to Patient? Covered Entity  Covered Entity Type? Durable Medical Equipment? Representative Name?  [address]

## 2023-08-30 LAB
ALBUMIN SERPL-MCNC: 4.5 G/DL (ref 3.6–4.6)
ALBUMIN/GLOB SERPL: 2 {RATIO} (ref 1.2–2.2)
ALP SERPL-CCNC: 67 IU/L (ref 44–121)
ALT SERPL-CCNC: 15 IU/L (ref 0–32)
AST SERPL-CCNC: 22 IU/L (ref 0–40)
BASOPHILS # BLD AUTO: 0.1 X10E3/UL (ref 0–0.2)
BASOPHILS NFR BLD AUTO: 1 %
BILIRUB SERPL-MCNC: 0.3 MG/DL (ref 0–1.2)
BUN SERPL-MCNC: 8 MG/DL (ref 10–36)
BUN/CREAT SERPL: 10 (ref 12–28)
CALCIUM SERPL-MCNC: 9.8 MG/DL (ref 8.7–10.3)
CHLORIDE SERPL-SCNC: 92 MMOL/L (ref 96–106)
CHOLEST SERPL-MCNC: 189 MG/DL (ref 100–199)
CO2 SERPL-SCNC: 28 MMOL/L (ref 20–29)
CREAT SERPL-MCNC: 0.79 MG/DL (ref 0.57–1)
EGFRCR SERPLBLD CKD-EPI 2021: 71 ML/MIN/1.73
EOSINOPHIL # BLD AUTO: 0.2 X10E3/UL (ref 0–0.4)
EOSINOPHIL NFR BLD AUTO: 3 %
ERYTHROCYTE [DISTWIDTH] IN BLOOD BY AUTOMATED COUNT: 14.3 % (ref 11.7–15.4)
GLOBULIN SER CALC-MCNC: 2.2 G/DL (ref 1.5–4.5)
GLUCOSE SERPL-MCNC: 94 MG/DL (ref 70–99)
HBA1C MFR BLD: 6.2 % (ref 4.8–5.6)
HCT VFR BLD AUTO: 37.5 % (ref 34–46.6)
HDLC SERPL-MCNC: 51 MG/DL
HGB BLD-MCNC: 12.7 G/DL (ref 11.1–15.9)
IMM GRANULOCYTES # BLD AUTO: 0 X10E3/UL (ref 0–0.1)
IMM GRANULOCYTES NFR BLD AUTO: 0 %
INR PPP: 2.1 (ref 0.9–1.2)
LDLC SERPL CALC-MCNC: 112 MG/DL (ref 0–99)
LYMPHOCYTES # BLD AUTO: 2.8 X10E3/UL (ref 0.7–3.1)
LYMPHOCYTES NFR BLD AUTO: 30 %
MCH RBC QN AUTO: 28.5 PG (ref 26.6–33)
MCHC RBC AUTO-ENTMCNC: 33.9 G/DL (ref 31.5–35.7)
MCV RBC AUTO: 84 FL (ref 79–97)
MONOCYTES # BLD AUTO: 0.9 X10E3/UL (ref 0.1–0.9)
MONOCYTES NFR BLD AUTO: 9 %
NEUTROPHILS # BLD AUTO: 5.3 X10E3/UL (ref 1.4–7)
NEUTROPHILS NFR BLD AUTO: 57 %
PLATELET # BLD AUTO: 337 X10E3/UL (ref 150–450)
POTASSIUM SERPL-SCNC: 4.1 MMOL/L (ref 3.5–5.2)
PROT SERPL-MCNC: 6.7 G/DL (ref 6–8.5)
PROTHROMBIN TIME: 21.3 SEC (ref 9.1–12)
RBC # BLD AUTO: 4.45 X10E6/UL (ref 3.77–5.28)
SODIUM SERPL-SCNC: 136 MMOL/L (ref 134–144)
TRIGL SERPL-MCNC: 148 MG/DL (ref 0–149)
TSH SERPL DL<=0.005 MIU/L-ACNC: 2.92 UIU/ML (ref 0.45–4.5)
VLDLC SERPL CALC-MCNC: 26 MG/DL (ref 5–40)
WBC # BLD AUTO: 9.3 X10E3/UL (ref 3.4–10.8)

## 2023-08-31 ENCOUNTER — TELEPHONE (OUTPATIENT)
Facility: CLINIC | Age: 88
End: 2023-08-31

## 2023-08-31 NOTE — TELEPHONE ENCOUNTER
Patient was calling you back and I let her know about her A1C and her INR but she wants you to call her please in case there was anything else.

## 2023-09-01 NOTE — TELEPHONE ENCOUNTER
Patient does need these supplies. It is the company calling not the patient calling.  She does not need these, B/L ankle and wrist.

## 2023-09-05 ENCOUNTER — CLINICAL DOCUMENTATION (OUTPATIENT)
Dept: CASE MANAGEMENT | Age: 88
End: 2023-09-05

## 2023-09-13 LAB
INR PPP: 3.6 (ref 0.9–1.2)
PROTHROMBIN TIME: 35.6 SEC (ref 9.1–12)

## 2023-09-14 ENCOUNTER — CLINICAL DOCUMENTATION (OUTPATIENT)
Dept: CASE MANAGEMENT | Age: 88
End: 2023-09-14

## 2023-09-18 ENCOUNTER — TELEPHONE (OUTPATIENT)
Facility: CLINIC | Age: 88
End: 2023-09-18

## 2023-09-18 NOTE — TELEPHONE ENCOUNTER
Patients daughter states that the patient does not want to go back this week and have rechecked and can you just tell her what she needs to do based on the 3.6 INR. Patient only goes to get INR checked every 2 weeks and its a process to have her go again based on age.

## 2023-09-18 NOTE — TELEPHONE ENCOUNTER
Patient states that she never got a call last week regarding INR being a 3.6. I informed the patient that she needs to get rechecked tomorrow or as soon as possible so we know what her INR is now. Is that what you suggest for her to do since its been almost a week? PROVIDER:[TOKEN:[[id number]:MIIS:[id number]],FOLLOWUP:[1 week]]

## 2023-09-19 ENCOUNTER — TELEPHONE (OUTPATIENT)
Facility: CLINIC | Age: 88
End: 2023-09-19

## 2023-09-19 NOTE — TELEPHONE ENCOUNTER
Called patient and left voice message to call our office. Want to see if they can see another provider for a sooner appointment.

## 2023-09-19 NOTE — TELEPHONE ENCOUNTER
----- Message from Hector Rosanna sent at 9/18/2023  3:11 PM EDT -----  Subject: Message to Provider    QUESTIONS  Information for Provider? Patient's daughter asking if any earlier   appointments for Kameron Gonzales open up if they can be contacted. Wants to get her   in a bit earlier. Please advise.  ---------------------------------------------------------------------------  --------------  Lena Contreras JWJW  [phone]; OK to leave message on voicemail  ---------------------------------------------------------------------------  --------------  SCRIPT ANSWERS  Relationship to Patient? Other/Third Party  Representative Name? Rip Richmond  Is the representative on the Communication Release of Information (SANTOS)   form in Epic?  Yes

## 2023-09-22 DIAGNOSIS — F41.9 ANXIETY AND DEPRESSION: ICD-10-CM

## 2023-09-22 DIAGNOSIS — F32.A ANXIETY AND DEPRESSION: ICD-10-CM

## 2023-09-22 RX ORDER — CLONAZEPAM 0.5 MG/1
0.25 TABLET ORAL 2 TIMES DAILY PRN
Qty: 30 TABLET | Refills: 0 | Status: SHIPPED | OUTPATIENT
Start: 2023-09-22 | End: 2023-11-21

## 2023-09-27 LAB
INR PPP: 2 (ref 0.9–1.2)
PROTHROMBIN TIME: 20.3 SEC (ref 9.1–12)

## 2023-09-29 ENCOUNTER — TELEPHONE (OUTPATIENT)
Facility: CLINIC | Age: 88
End: 2023-09-29

## 2023-09-29 NOTE — TELEPHONE ENCOUNTER
Patient thinks 5 mg is too much of Coumadin, she would like to cut back to 3 mg QD. She wanted to know if that is okay. Progress Summary  Pt has attended 8 visits in Physical Therapy.  Dx: Neck pain (M54.2)         Insurance (Authorized # of Visits):  6           Authorizing Physician: Dr. Sheila Desai  Next MD visit: none scheduled  Fall Risk: standard         Precautions: n/a 8/6/21  TX#: 4/8 Date:               8/17/21  TX#: 5/8 Date: 8/20/21  Tx#: 6/8 Date: 8/23/21  Tx#: 7/8 Date: 8/27/21  Tx#: 8/8 Date: 8/27/21  Tx#: 9/8 Date: 9/27/21  Tx#: 10/8   UBE 2,2  UBE 2,2  UBE 2,2 UBE 2,2 UBE 2,2 UBE 2,2 UBE 2,2 UBE 2,2 x5 reps 15 sec each   PROM of cervical spine into restricted ranges x multiple bouts   isometrics to cervical spine multiple bouts SB>rotation   DNF x10 rep MT:    UT, levators, suboccipitals multiple bouts   manual cervical traction x5 reps 15 sec each

## 2023-10-02 RX ORDER — WARFARIN SODIUM 1 MG/1
3 TABLET ORAL DAILY
Qty: 270 TABLET | Refills: 0 | Status: SHIPPED | OUTPATIENT
Start: 2023-10-02 | End: 2023-11-10 | Stop reason: SDUPTHER

## 2023-10-11 LAB
INR PPP: 1.6 (ref 0.9–1.2)
PROTHROMBIN TIME: 16.6 SEC (ref 9.1–12)

## 2023-10-16 ENCOUNTER — OFFICE VISIT (OUTPATIENT)
Facility: CLINIC | Age: 88
End: 2023-10-16
Payer: MEDICARE

## 2023-10-16 VITALS
TEMPERATURE: 97.8 F | HEIGHT: 61 IN | DIASTOLIC BLOOD PRESSURE: 64 MMHG | BODY MASS INDEX: 33.99 KG/M2 | HEART RATE: 86 BPM | OXYGEN SATURATION: 95 % | WEIGHT: 180 LBS | RESPIRATION RATE: 18 BRPM | SYSTOLIC BLOOD PRESSURE: 124 MMHG

## 2023-10-16 DIAGNOSIS — I48.11 LONGSTANDING PERSISTENT ATRIAL FIBRILLATION (HCC): Primary | ICD-10-CM

## 2023-10-16 DIAGNOSIS — E03.9 HYPOTHYROIDISM, ADULT: ICD-10-CM

## 2023-10-16 DIAGNOSIS — R73.03 PREDIABETES: ICD-10-CM

## 2023-10-16 DIAGNOSIS — R16.1 SPLENIC MASS: ICD-10-CM

## 2023-10-16 DIAGNOSIS — D51.0 PERNICIOUS ANEMIA: ICD-10-CM

## 2023-10-16 DIAGNOSIS — K21.9 GASTROESOPHAGEAL REFLUX DISEASE, UNSPECIFIED WHETHER ESOPHAGITIS PRESENT: ICD-10-CM

## 2023-10-16 DIAGNOSIS — F41.9 ANXIETY: ICD-10-CM

## 2023-10-16 DIAGNOSIS — M25.569 CHRONIC KNEE PAIN, UNSPECIFIED LATERALITY: ICD-10-CM

## 2023-10-16 DIAGNOSIS — Z76.89 ENCOUNTER TO ESTABLISH CARE: ICD-10-CM

## 2023-10-16 DIAGNOSIS — G89.29 CHRONIC KNEE PAIN, UNSPECIFIED LATERALITY: ICD-10-CM

## 2023-10-16 DIAGNOSIS — Z85.3 HISTORY OF BREAST CANCER: ICD-10-CM

## 2023-10-16 PROBLEM — E07.9 THYROID DISORDER: Status: RESOLVED | Noted: 2021-07-02 | Resolved: 2023-10-16

## 2023-10-16 PROCEDURE — G8417 CALC BMI ABV UP PARAM F/U: HCPCS | Performed by: FAMILY MEDICINE

## 2023-10-16 PROCEDURE — 1123F ACP DISCUSS/DSCN MKR DOCD: CPT | Performed by: FAMILY MEDICINE

## 2023-10-16 PROCEDURE — 4004F PT TOBACCO SCREEN RCVD TLK: CPT | Performed by: FAMILY MEDICINE

## 2023-10-16 PROCEDURE — G8427 DOCREV CUR MEDS BY ELIG CLIN: HCPCS | Performed by: FAMILY MEDICINE

## 2023-10-16 PROCEDURE — G8484 FLU IMMUNIZE NO ADMIN: HCPCS | Performed by: FAMILY MEDICINE

## 2023-10-16 PROCEDURE — 99214 OFFICE O/P EST MOD 30 MIN: CPT | Performed by: FAMILY MEDICINE

## 2023-10-16 PROCEDURE — 1090F PRES/ABSN URINE INCON ASSESS: CPT | Performed by: FAMILY MEDICINE

## 2023-10-16 RX ORDER — BUSPIRONE HYDROCHLORIDE 5 MG/1
5 TABLET ORAL 3 TIMES DAILY PRN
COMMUNITY
End: 2023-10-16 | Stop reason: SDUPTHER

## 2023-10-16 RX ORDER — BUSPIRONE HYDROCHLORIDE 5 MG/1
5 TABLET ORAL 2 TIMES DAILY
Qty: 180 TABLET | Refills: 1 | Status: SHIPPED | OUTPATIENT
Start: 2023-10-16 | End: 2023-11-03 | Stop reason: SDUPTHER

## 2023-10-16 NOTE — PROGRESS NOTES
Subjective  Chief Complaint   Patient presents with    [facility]     HPI:  Theo Shine is a 80 y.o. female with medical problems as listed below who presents to [facility]. Patient is accompanied by her daughter. Past medical history: Anxiety (has spells), prediabetes, atrial fibrillation, pernicious anemia, hx of breast cancer, fibromyalgia, hypothyroidism, splenic mass? Medications: Has clonazepam PRN (daughter is worried she wasn't taking this correctly), warfarin, Cymbalta BID, propranolol, levothyroxine, diltiazem, B12 injection, buspar, esomeprazole, calcium-vitamin D, anastrozole, PRN meclizine  Allergies: Codeine (hives)  Specialists: Dr. Marisol Macdonald (Cardiology-sees annually), Dr. Ryan Castanon (Ophthalmology), Dr. Maurilio Latif NP (CSI in knee), Dr. Alyson Hewitt (Oncology)  Surgical history: Appendectomy, L breast biopsy, cholecystectomy, hysterectomy  Family history: Breast cancer (sister). Cancer, CKD (brother). Social history: Snuff, no alcohol, no drugs. Anxiety: Not taking Buspar every day because she was confused about how this should be taken. She was recently prescribed clonazepam by her former PCP. Atrial fibrillation: Taking warfarin, 3mg daily. Dose was not changed after last check which was subtherapeutic. Sees Dr. Violetta Mazariegos (Cardiology).      Patient Active Problem List   Diagnosis    Hypokalemia    Hyponatremia    Hypomagnesemia    Atrial fibrillation (HCC)    DDD (degenerative disc disease), cervical    DDD (degenerative disc disease), lumbosacral    Fibromyalgia    Hypothyroidism, adult    History of breast cancer    Pernicious anemia    Umbilical hernia without obstruction or gangrene    Splenic mass    Knee pain    Obesity    Tendinitis of right rotator cuff    Anxiety    Prediabetes     Family History   Problem Relation Age of Onset    Breast Cancer Sister     Kidney Disease Other     Cancer Brother       Social History     Tobacco Use    Smoking status: Never    Smokeless

## 2023-10-23 DIAGNOSIS — I48.11 LONGSTANDING PERSISTENT ATRIAL FIBRILLATION (HCC): ICD-10-CM

## 2023-10-25 DIAGNOSIS — I48.91 ATRIAL FIBRILLATION, UNSPECIFIED TYPE (HCC): Primary | ICD-10-CM

## 2023-10-25 DIAGNOSIS — I48.11 LONGSTANDING PERSISTENT ATRIAL FIBRILLATION (HCC): ICD-10-CM

## 2023-10-25 LAB
INR PPP: 1.4 (ref 0.9–1.2)
PROTHROMBIN TIME: 14.7 SEC (ref 9.1–12)

## 2023-10-25 NOTE — TELEPHONE ENCOUNTER
Last OV:10/16/2023  Next OV:12/21/2023  Last Refill:12//29/2022  Last (labs):10/25/2023    -*Insert any notes here*  Requested Prescriptions     Pending Prescriptions Disp Refills    dilTIAZem (TIAZAC) 120 MG extended release capsule 60 capsule 3     Sig: Take 1 capsule by mouth daily     '

## 2023-10-26 ENCOUNTER — TELEPHONE (OUTPATIENT)
Facility: CLINIC | Age: 88
End: 2023-10-26

## 2023-10-26 NOTE — TELEPHONE ENCOUNTER
----- Message from Dane Rodriguez DO sent at 10/25/2023  9:28 AM EDT -----  INR was too low which means your warfarin dose is not therapeutic. I would recommend that we increase the dose of your warfarin. My recommendation is the following: 3mg Monday through Friday and 4mg on Saturday and Sunday. We should repeat the INR in 1 week. [facility]

## 2023-10-27 RX ORDER — DILTIAZEM HYDROCHLORIDE 120 MG/1
240 CAPSULE, EXTENDED RELEASE ORAL DAILY
Qty: 180 CAPSULE | Refills: 1 | Status: SHIPPED | OUTPATIENT
Start: 2023-10-27

## 2023-10-30 NOTE — TELEPHONE ENCOUNTER
Requested Prescriptions     Pending Prescriptions Disp Refills    propranolol (INDERAL) 10 MG tablet 180 tablet 0     Sig: Take 1 tablet by mouth 2 times daily    cyanocobalamin 1000 MCG/ML injection 1 mL 2    SYRINGE-NEEDLE, DISP, 3 ML 25G X 5/8\" 3 ML MISC       Sig: by Does not apply route

## 2023-11-01 PROBLEM — S80.01XA CONTUSION OF RIGHT KNEE: Status: RESOLVED | Noted: 2021-07-02 | Resolved: 2023-11-01

## 2023-11-01 PROBLEM — J18.9 COMMUNITY ACQUIRED BILATERAL LOWER LOBE PNEUMONIA: Status: RESOLVED | Noted: 2021-08-19 | Resolved: 2023-11-01

## 2023-11-01 RX ORDER — CYANOCOBALAMIN 1000 UG/ML
1000 INJECTION, SOLUTION INTRAMUSCULAR; SUBCUTANEOUS
Qty: 3 ML | Refills: 1 | Status: SHIPPED | OUTPATIENT
Start: 2023-11-01 | End: 2023-11-03 | Stop reason: SDUPTHER

## 2023-11-01 RX ORDER — PROPRANOLOL HYDROCHLORIDE 10 MG/1
10 TABLET ORAL 2 TIMES DAILY
Qty: 180 TABLET | Refills: 1 | Status: SHIPPED | OUTPATIENT
Start: 2023-11-01 | End: 2023-11-03 | Stop reason: SDUPTHER

## 2023-11-02 ENCOUNTER — HOSPITAL ENCOUNTER (EMERGENCY)
Facility: HOSPITAL | Age: 88
Discharge: HOME | End: 2023-11-02
Attending: EMERGENCY MEDICINE
Payer: MEDICARE

## 2023-11-02 VITALS
SYSTOLIC BLOOD PRESSURE: 93 MMHG | DIASTOLIC BLOOD PRESSURE: 50 MMHG | HEIGHT: 63 IN | RESPIRATION RATE: 27 BRPM | BODY MASS INDEX: 30.12 KG/M2 | OXYGEN SATURATION: 97 % | TEMPERATURE: 98.7 F | WEIGHT: 170 LBS | HEART RATE: 94 BPM

## 2023-11-02 DIAGNOSIS — I48.91 ATRIAL FIBRILLATION, UNSPECIFIED TYPE (HCC): ICD-10-CM

## 2023-11-02 DIAGNOSIS — E87.6 HYPOKALEMIA: ICD-10-CM

## 2023-11-02 DIAGNOSIS — R53.83 OTHER FATIGUE: Primary | ICD-10-CM

## 2023-11-02 LAB
ALBUMIN SERPL-MCNC: 3.4 G/DL (ref 3.5–5)
ALBUMIN/GLOB SERPL: 1 (ref 1.1–2.2)
ALP SERPL-CCNC: 76 U/L (ref 45–117)
ALT SERPL-CCNC: 24 U/L (ref 12–78)
ANION GAP SERPL CALC-SCNC: 11 MMOL/L (ref 5–15)
APPEARANCE UR: CLEAR
AST SERPL W P-5'-P-CCNC: 18 U/L (ref 15–37)
BACTERIA URNS QL MICRO: NEGATIVE /HPF
BASOPHILS # BLD: 0.1 K/UL (ref 0–0.1)
BASOPHILS NFR BLD: 0 % (ref 0–1)
BILIRUB SERPL-MCNC: 0.8 MG/DL (ref 0.2–1)
BILIRUB UR QL: NEGATIVE
BUN SERPL-MCNC: 7 MG/DL (ref 6–20)
BUN/CREAT SERPL: 11 (ref 12–20)
CA-I BLD-MCNC: 8.8 MG/DL (ref 8.5–10.1)
CHLORIDE SERPL-SCNC: 93 MMOL/L (ref 97–108)
CO2 SERPL-SCNC: 30 MMOL/L (ref 21–32)
COLOR UR: ABNORMAL
CREAT SERPL-MCNC: 0.62 MG/DL (ref 0.55–1.02)
DIFFERENTIAL METHOD BLD: ABNORMAL
EKG ATRIAL RATE: 97 BPM
EKG DIAGNOSIS: NORMAL
EKG Q-T INTERVAL: 358 MS
EKG QRS DURATION: 90 MS
EKG QTC CALCULATION (BAZETT): 468 MS
EKG R AXIS: 84 DEGREES
EKG T AXIS: 252 DEGREES
EKG VENTRICULAR RATE: 103 BPM
EOSINOPHIL # BLD: 0 K/UL (ref 0–0.4)
EOSINOPHIL NFR BLD: 0 % (ref 0–7)
EPITH CASTS URNS QL MICRO: ABNORMAL /LPF
ERYTHROCYTE [DISTWIDTH] IN BLOOD BY AUTOMATED COUNT: 14.2 % (ref 11.5–14.5)
GLOBULIN SER CALC-MCNC: 3.4 G/DL (ref 2–4)
GLUCOSE SERPL-MCNC: 114 MG/DL (ref 65–100)
GLUCOSE UR STRIP.AUTO-MCNC: NEGATIVE MG/DL
HCT VFR BLD AUTO: 35.3 % (ref 35–47)
HGB BLD-MCNC: 11.9 G/DL (ref 11.5–16)
HGB UR QL STRIP: NEGATIVE
IMM GRANULOCYTES # BLD AUTO: 0.2 K/UL (ref 0–0.04)
IMM GRANULOCYTES NFR BLD AUTO: 1 % (ref 0–0.5)
INR PPP: 1.6 (ref 0.9–1.1)
KETONES UR QL STRIP.AUTO: 5 MG/DL
LEUKOCYTE ESTERASE UR QL STRIP.AUTO: NEGATIVE
LYMPHOCYTES # BLD: 1.9 K/UL (ref 0.8–3.5)
LYMPHOCYTES NFR BLD: 11 % (ref 12–49)
MCH RBC QN AUTO: 28.7 PG (ref 26–34)
MCHC RBC AUTO-ENTMCNC: 33.7 G/DL (ref 30–36.5)
MCV RBC AUTO: 85.1 FL (ref 80–99)
MONOCYTES # BLD: 1.4 K/UL (ref 0–1)
MONOCYTES NFR BLD: 9 % (ref 5–13)
NEUTS SEG # BLD: 13 K/UL (ref 1.8–8)
NEUTS SEG NFR BLD: 79 % (ref 32–75)
NITRITE UR QL STRIP.AUTO: NEGATIVE
NRBC # BLD: 0 K/UL (ref 0–0.01)
NRBC BLD-RTO: 0 PER 100 WBC
PH UR STRIP: 7 (ref 5–8)
PLATELET # BLD AUTO: 244 K/UL (ref 150–400)
PMV BLD AUTO: 9.5 FL (ref 8.9–12.9)
POTASSIUM SERPL-SCNC: 3.3 MMOL/L (ref 3.5–5.1)
PROT SERPL-MCNC: 6.8 G/DL (ref 6.4–8.2)
PROT UR STRIP-MCNC: NEGATIVE MG/DL
PROTHROMBIN TIME: 19.6 SEC (ref 11.9–14.6)
RBC # BLD AUTO: 4.15 M/UL (ref 3.8–5.2)
RBC #/AREA URNS HPF: ABNORMAL /HPF (ref 0–5)
SODIUM SERPL-SCNC: 134 MMOL/L (ref 136–145)
SP GR UR REFRACTOMETRY: 1.01 (ref 1–1.03)
TROPONIN I SERPL HS-MCNC: 16 NG/L (ref 0–51)
TROPONIN I SERPL HS-MCNC: 16 NG/L (ref 0–51)
UROBILINOGEN UR QL STRIP.AUTO: 0.1 EU/DL (ref 0.1–1)
WBC # BLD AUTO: 16.5 K/UL (ref 3.6–11)
WBC URNS QL MICRO: ABNORMAL /HPF (ref 0–4)

## 2023-11-02 PROCEDURE — 99284 EMERGENCY DEPT VISIT MOD MDM: CPT

## 2023-11-02 PROCEDURE — 85610 PROTHROMBIN TIME: CPT

## 2023-11-02 PROCEDURE — 81001 URINALYSIS AUTO W/SCOPE: CPT

## 2023-11-02 PROCEDURE — 80053 COMPREHEN METABOLIC PANEL: CPT

## 2023-11-02 PROCEDURE — 84484 ASSAY OF TROPONIN QUANT: CPT

## 2023-11-02 PROCEDURE — 93005 ELECTROCARDIOGRAM TRACING: CPT | Performed by: EMERGENCY MEDICINE

## 2023-11-02 PROCEDURE — 6370000000 HC RX 637 (ALT 250 FOR IP): Performed by: EMERGENCY MEDICINE

## 2023-11-02 PROCEDURE — 85025 COMPLETE CBC W/AUTO DIFF WBC: CPT

## 2023-11-02 PROCEDURE — 36415 COLL VENOUS BLD VENIPUNCTURE: CPT

## 2023-11-02 PROCEDURE — 94760 N-INVAS EAR/PLS OXIMETRY 1: CPT

## 2023-11-02 RX ORDER — 0.9 % SODIUM CHLORIDE 0.9 %
1000 INTRAVENOUS SOLUTION INTRAVENOUS ONCE
Status: DISCONTINUED | OUTPATIENT
Start: 2023-11-02 | End: 2023-11-02 | Stop reason: HOSPADM

## 2023-11-02 RX ORDER — POTASSIUM CHLORIDE 750 MG/1
40 TABLET, FILM COATED, EXTENDED RELEASE ORAL ONCE
Status: COMPLETED | OUTPATIENT
Start: 2023-11-02 | End: 2023-11-02

## 2023-11-02 RX ADMIN — POTASSIUM CHLORIDE 40 MEQ: 750 TABLET, EXTENDED RELEASE ORAL at 14:05

## 2023-11-02 NOTE — TELEPHONE ENCOUNTER
----- Message from [facility] sent at 11/1/2023 12:25 PM EDT -----  Subject: Medication Problem    Medication: cyanocobalamin 1000 MCG/ML injection  Dosage: 1 injection   Ordering Provider: edgardo    Question/Problem: Pt wanted ALL her prescription that was put in to go   from [facility] on [address] in [facility]. Please resend all the rx from [facility] to here.        Pharmacy: [address] [place] [address]   Negra Tolbert [phone] [address] [phone]    ---------------------------------------------------------------------------  --------------  Ai LAYTON  [phone]; OK to leave message on voicemail  ---------------------------------------------------------------------------  --------------    SCRIPT ANSWERS  Relationship to Patient: Self

## 2023-11-02 NOTE — TELEPHONE ENCOUNTER
Patient is requesting that all medication be resent to [facility] pharmacy.         Pharmacy has been updated

## 2023-11-02 NOTE — ED TRIAGE NOTES
Per ems pt nurse aid called for weakness since this morning, 12 lead for ems shows a fib with rvr, pt hx a fib.  Denies CP/ SOB

## 2023-11-02 NOTE — ED PROVIDER NOTES
[facility] EMERGENCY DEPT  EMERGENCY DEPARTMENT HISTORY AND PHYSICAL EXAM      Date: 11/2/2023  Patient Name: Paulina Sheridan  MRN: [id number]  [address] [facility] 6/6/1933  Date of evaluation: 11/2/2023  Provider: Renata Rosales MD   Note Started: 2:47 PM EDT 11/2/23    HISTORY OF PRESENT ILLNESS     Chief Complaint   Patient presents with    Fatigue       History Provided By: Patient    HPI: Paulina Sheridan is a 80 y.o. female patient presents very tired and fatigued. No black or bloody stool. No abdominal pain chest pain headache or dizziness or passing out.     PAST MEDICAL HISTORY   Past Medical History:  Past Medical History:   Diagnosis Date    Atrial fibrillation ([address])     Atrial fibrillation with controlled ventricular response ([address])     Community acquired bilateral lower lobe pneumonia     DDD (degenerative disc disease), cervical     DDD (degenerative disc disease), lumbosacral     Escherichia coli sepsis (HCC)     Fibromyalgia     Hypomagnesemia     Hypothyroid     Hypothyroidism, adult     Invasive ductal carcinoma of breast, right (HCC)     Osteoarthritis of cervical spine, unspecified spinal osteoarthritis complication status     Pernicious anemia     Splenic mass     Umbilical hernia without obstruction or gangrene        Past Surgical History:  Past Surgical History:   Procedure Laterality Date    APPENDECTOMY      BREAST BIOPSY Left     benign    CHOLECYSTECTOMY      HYSTERECTOMY (CERVIX STATUS UNKNOWN)  1970    HYSTERECTOMY (CERVIX STATUS UNKNOWN)      age 40    [facility] BREAST BIOPSY RIGHT Right 4/11/2022    [facility] LOC BREAST BIOPSY RIGHT 4/11/2022 [facility] RAD MAMMO    US BREAST BIOPSY W LOC DEVICE 1ST LESION RIGHT Right 2/3/2022    US BREAST NEEDLE BIOPSY RIGHT 2/3/2022 [facility] RAD [facility]AM[facility]       Family History:  Family History   Problem Relation Age of Onset    Breast Cancer Sister     Kidney Disease Other     Cancer Brother        Social History:  Social History     Tobacco Use    Smoking status: MEDICATIONS:  Current Discharge Medication List          I am the Primary Clinician of Record. Julian Boo MD (electronically signed)    (Please note that parts of this dictation were completed with voice recognition software. Quite often unanticipated grammatical, syntax, homophones, and other interpretive errors are inadvertently transcribed by the computer software. Please disregards these errors.  Please excuse any errors that have escaped final proofreading.)        Julian Boo MD  11/02/23 1617       Julian Boo MD  11/02/23 1379

## 2023-11-02 NOTE — DISCHARGE INSTRUCTIONS
Thank you! Thank you for allowing me to care for you in the emergency department. It is my goal to provide you with excellent care. If you have not received excellent quality care, please ask to speak to the nurse manager. Please fill out the survey that will come to you by mail or email since we listen to your feedback! Below you will find a list of your tests from today's visit. Should you have any questions, please do not hesitate to call the emergency department.     Labs  Recent Results (from the past 12 hour(s))   EKG 12 Lead    Collection Time: 11/02/23 12:21 PM   Result Value Ref Range    Ventricular Rate 103 BPM    Atrial Rate 97 BPM    QRS Duration 90 ms    Q-T Interval 358 ms    QTc Calculation (Bazett) 468 ms    R Axis 84 degrees    T Axis 252 degrees    Diagnosis       Atrial fibrillation with rapid ventricular response  Voltage criteria for left ventricular hypertrophy  Poor R wave progression  Marked ST abnormality, possible inferior subendocardial injury  Abnormal ECG  When compared with ECG of 19-AUG-2021 01:33,  Minimal criteria for Septal infarct are now Present  Confirmed by Rafael Kelsey ([id number]) on 11/2/2023 1:46:01 PM     CBC with Auto Differential    Collection Time: 11/02/23 12:31 PM   Result Value Ref Range    WBC 16.5 (H) 3.6 - 11.0 K/uL    RBC 4.15 3.80 - 5.20 M/uL    Hemoglobin 11.9 11.5 - 16.0 g/dL    Hematocrit 35.3 35.0 - 47.0 %    MCV 85.1 80.0 - 99.0 FL    MCH 28.7 26.0 - 34.0 PG    MCHC 33.7 30.0 - 36.5 g/dL    RDW 14.2 11.5 - 14.5 %    Platelets 973 602 - 746 K/uL    MPV 9.5 8.9 - 12.9 FL    Nucleated RBCs 0.0 0.0  WBC    nRBC 0.00 0.00 - 0.01 K/uL    Neutrophils % 79 (H) 32 - 75 %    Lymphocytes % 11 (L) 12 - 49 %    Monocytes % 9 5 - 13 %    Eosinophils % 0 0 - 7 %    Basophils % 0 0 - 1 %    Immature Granulocytes 1 (H) 0 - 0.5 %    Neutrophils Absolute 13.0 (H) 1.8 - 8.0 K/UL    Lymphocytes Absolute 1.9 0.8 - 3.5 K/UL    Monocytes Absolute 1.4 (H) 0.0 - 1.0

## 2023-11-03 RX ORDER — BUSPIRONE HYDROCHLORIDE 5 MG/1
5 TABLET ORAL 2 TIMES DAILY
Qty: 180 TABLET | Refills: 1 | Status: SHIPPED | OUTPATIENT
Start: 2023-11-03

## 2023-11-03 RX ORDER — CYANOCOBALAMIN 1000 UG/ML
1000 INJECTION, SOLUTION INTRAMUSCULAR; SUBCUTANEOUS
Qty: 3 ML | Refills: 1 | Status: SHIPPED | OUTPATIENT
Start: 2023-11-03

## 2023-11-03 RX ORDER — PROPRANOLOL HYDROCHLORIDE 10 MG/1
10 TABLET ORAL 2 TIMES DAILY
Qty: 180 TABLET | Refills: 1 | Status: SHIPPED | OUTPATIENT
Start: 2023-11-03

## 2023-11-03 RX ORDER — DILTIAZEM HYDROCHLORIDE 120 MG/1
240 CAPSULE, EXTENDED RELEASE ORAL DAILY
Qty: 180 CAPSULE | Refills: 1 | Status: SHIPPED | OUTPATIENT
Start: 2023-11-03

## 2023-11-03 RX ORDER — PROPRANOLOL HYDROCHLORIDE 10 MG/1
10 TABLET ORAL 2 TIMES DAILY
Qty: 180 TABLET | Refills: 1 | OUTPATIENT
Start: 2023-11-03

## 2023-11-07 ENCOUNTER — OFFICE VISIT (OUTPATIENT)
Facility: CLINIC | Age: 88
End: 2023-11-07
Payer: MEDICARE

## 2023-11-07 ENCOUNTER — HOSPITAL ENCOUNTER (OUTPATIENT)
Facility: HOSPITAL | Age: 88
Discharge: HOME | End: 2023-11-10
Payer: MEDICARE

## 2023-11-07 VITALS
BODY MASS INDEX: 31.36 KG/M2 | HEART RATE: 93 BPM | WEIGHT: 177 LBS | RESPIRATION RATE: 18 BRPM | SYSTOLIC BLOOD PRESSURE: 132 MMHG | TEMPERATURE: 97.5 F | DIASTOLIC BLOOD PRESSURE: 65 MMHG | HEIGHT: 63 IN | OXYGEN SATURATION: 94 %

## 2023-11-07 DIAGNOSIS — I48.91 ATRIAL FIBRILLATION, UNSPECIFIED TYPE (HCC): ICD-10-CM

## 2023-11-07 DIAGNOSIS — R05.8 COUGH WITH SPUTUM: ICD-10-CM

## 2023-11-07 DIAGNOSIS — J18.9 COMMUNITY ACQUIRED PNEUMONIA OF RIGHT LUNG, UNSPECIFIED PART OF LUNG: Primary | ICD-10-CM

## 2023-11-07 DIAGNOSIS — J02.9 SORE THROAT: ICD-10-CM

## 2023-11-07 DIAGNOSIS — R05.8 COUGH WITH SPUTUM: Primary | ICD-10-CM

## 2023-11-07 LAB
GROUP A STREP ANTIGEN, POC: NEGATIVE
VALID INTERNAL CONTROL, POC: YES

## 2023-11-07 PROCEDURE — 1090F PRES/ABSN URINE INCON ASSESS: CPT | Performed by: STUDENT IN AN ORGANIZED HEALTH CARE EDUCATION/TRAINING PROGRAM

## 2023-11-07 PROCEDURE — 71046 X-RAY EXAM CHEST 2 VIEWS: CPT

## 2023-11-07 PROCEDURE — G8484 FLU IMMUNIZE NO ADMIN: HCPCS | Performed by: STUDENT IN AN ORGANIZED HEALTH CARE EDUCATION/TRAINING PROGRAM

## 2023-11-07 PROCEDURE — 99214 OFFICE O/P EST MOD 30 MIN: CPT | Performed by: STUDENT IN AN ORGANIZED HEALTH CARE EDUCATION/TRAINING PROGRAM

## 2023-11-07 PROCEDURE — G8417 CALC BMI ABV UP PARAM F/U: HCPCS | Performed by: STUDENT IN AN ORGANIZED HEALTH CARE EDUCATION/TRAINING PROGRAM

## 2023-11-07 PROCEDURE — G8427 DOCREV CUR MEDS BY ELIG CLIN: HCPCS | Performed by: STUDENT IN AN ORGANIZED HEALTH CARE EDUCATION/TRAINING PROGRAM

## 2023-11-07 PROCEDURE — 4004F PT TOBACCO SCREEN RCVD TLK: CPT | Performed by: STUDENT IN AN ORGANIZED HEALTH CARE EDUCATION/TRAINING PROGRAM

## 2023-11-07 PROCEDURE — 1123F ACP DISCUSS/DSCN MKR DOCD: CPT | Performed by: STUDENT IN AN ORGANIZED HEALTH CARE EDUCATION/TRAINING PROGRAM

## 2023-11-07 PROCEDURE — 87880 STREP A ASSAY W/OPTIC: CPT | Performed by: STUDENT IN AN ORGANIZED HEALTH CARE EDUCATION/TRAINING PROGRAM

## 2023-11-07 RX ORDER — AMOXICILLIN AND CLAVULANATE POTASSIUM 875; 125 MG/1; MG/1
1 TABLET, FILM COATED ORAL 2 TIMES DAILY
Qty: 14 TABLET | Refills: 0 | Status: SHIPPED | OUTPATIENT
Start: 2023-11-07 | End: 2023-11-14

## 2023-11-07 RX ORDER — GUAIFENESIN/DEXTROMETHORPHAN 100-10MG/5
5 SYRUP ORAL 3 TIMES DAILY PRN
Qty: 120 ML | Refills: 0 | Status: SHIPPED | OUTPATIENT
Start: 2023-11-07 | End: 2023-11-17

## 2023-11-07 RX ORDER — AZITHROMYCIN 250 MG/1
250 TABLET, FILM COATED ORAL SEE ADMIN INSTRUCTIONS
Qty: 6 TABLET | Refills: 0 | Status: SHIPPED | OUTPATIENT
Start: 2023-11-07 | End: 2023-11-12

## 2023-11-07 RX ORDER — MENTHOL/CETYLPYRD CL 4.5 MG
1 LOZENGE MUCOUS MEMBRANE EVERY 6 HOURS PRN
Qty: 30 LOZENGE | Refills: 0 | Status: SHIPPED | OUTPATIENT
Start: 2023-11-07

## 2023-11-07 ASSESSMENT — ENCOUNTER SYMPTOMS: SHORTNESS OF BREATH: 0

## 2023-11-07 NOTE — TELEPHONE ENCOUNTER
----- Message from [facility], DO sent at 30/4/6747  1:24 PM EST -----  Can we please let patient know chest xray comes back and does look abnormal so will treat for pneumonia. Sent two antibiotics to pharmacy with the other meds. Take probiotic yogurt to avoid diarrhea with the antibiotics. Additionally there is a small risk the zpack can affect the warfarin so it will be important to keep up with the INR checks and let us know any signs of bleeding.

## 2023-11-07 NOTE — PROGRESS NOTES
[facility]  Subjective       Chief Complaint   Patient presents with    Pharyngitis    Cough    Shortness of Breath     HPI:  Johny Kiser is a 80 y.o. female here for acute visit:    Symptoms occurring for about 2-3 weeks now, not improving    Sore throat   Loss appetite for about 2-3 weeks   Although decreased appetite still eating things that are appealing  Reports decreased fluid intake  Currently snuff never smoked    Went to ED on 11/2 for fatigue  Slight hypokalemia at that visit 3.3 and BP was 93/50 in ED  Given fluids 1 L NS  Has atrial fibrillation, had not taken propranolol that day according to daughter    Baseline feeling winded - says she has one nostril side closed  Reports baseline feels some shortness of breath    Productive Cough  Coughing worse at night   Reports coughing some at daytime  Having some sputum   Feeling Fatigued    Got an aide in July  Reports not used to having company (retired)    Reports she has GERD but symptoms controlled on medication and if she avoids triggers like onions    Denies fevers or chills  Additionally complains of crusting to eyelids the last few days  No change in vision    Past Medical History:   Diagnosis Date    Atrial fibrillation ([address])     Atrial fibrillation with controlled ventricular response ([address])     Community acquired bilateral lower lobe pneumonia     DDD (degenerative disc disease), cervical     DDD (degenerative disc disease), lumbosacral     Escherichia coli sepsis ([address])     Fibromyalgia     Hypomagnesemia     Hypothyroid     Hypothyroidism, adult     Invasive ductal carcinoma of breast, right ([address])     Osteoarthritis of cervical spine, unspecified spinal osteoarthritis complication status     Pernicious anemia     Splenic mass     Umbilical hernia without obstruction or gangrene      Current Outpatient Medications   Medication Sig Dispense Refill    Menthol (CEPACOL SORE THROAT) 5.4 MG LOZG Take 1 lozenge by mouth every

## 2023-11-08 LAB
INR PPP: 1.8 (ref 0.9–1.2)
PROTHROMBIN TIME: 18.5 SEC (ref 9.1–12)

## 2023-11-09 DIAGNOSIS — I48.91 ATRIAL FIBRILLATION, UNSPECIFIED TYPE (HCC): Primary | ICD-10-CM

## 2023-11-09 NOTE — TELEPHONE ENCOUNTER
Last OV:11/07/2023  Next OV:12/21/2023  Last Refill:warfarin; 10/02/2023 diltiazem 11/03/2023  Last (labs):11/07/2023 pt inr    -*Insert any notes here*  Requested Prescriptions     Pending Prescriptions Disp Refills    warfarin (COUMADIN) 1 MG tablet 270 tablet 0     Sig: Take 3 tablets by mouth daily    dilTIAZem (TIAZAC) 120 MG extended release capsule 180 capsule 1     Sig: Take 2 capsules by mouth daily

## 2023-11-10 ENCOUNTER — TELEPHONE (OUTPATIENT)
Facility: CLINIC | Age: 88
End: 2023-11-10

## 2023-11-10 DIAGNOSIS — I48.11 LONGSTANDING PERSISTENT ATRIAL FIBRILLATION (HCC): ICD-10-CM

## 2023-11-10 DIAGNOSIS — R93.89 ABNORMAL FINDING ON CHEST XRAY: ICD-10-CM

## 2023-11-10 DIAGNOSIS — I48.91 ATRIAL FIBRILLATION, UNSPECIFIED TYPE (HCC): Primary | ICD-10-CM

## 2023-11-10 DIAGNOSIS — Z51.81 ALTERATION IN ANTICOAGULATION: ICD-10-CM

## 2023-11-10 DIAGNOSIS — Z79.01 ALTERATION IN ANTICOAGULATION: ICD-10-CM

## 2023-11-10 RX ORDER — DILTIAZEM HYDROCHLORIDE 120 MG/1
240 CAPSULE, EXTENDED RELEASE ORAL DAILY
Qty: 180 CAPSULE | Refills: 1 | Status: SHIPPED | OUTPATIENT
Start: 2023-11-10

## 2023-11-10 RX ORDER — WARFARIN SODIUM 1 MG/1
TABLET ORAL
Qty: 280 TABLET | Refills: 3 | Status: SHIPPED | OUTPATIENT
Start: 2023-11-10

## 2023-11-11 PROBLEM — F41.9 ANXIETY: Status: ACTIVE | Noted: 2023-11-11

## 2023-11-11 PROBLEM — K21.9 GERD (GASTROESOPHAGEAL REFLUX DISEASE): Status: ACTIVE | Noted: 2023-11-11

## 2023-11-11 PROBLEM — R73.03 PREDIABETES: Status: ACTIVE | Noted: 2023-11-11

## 2023-11-11 PROBLEM — A41.51 ESCHERICHIA COLI SEPSIS (HCC): Status: RESOLVED | Noted: 2023-02-13 | Resolved: 2023-11-11

## 2023-11-11 PROBLEM — R94.31 ABNORMAL EKG: Status: RESOLVED | Noted: 2021-08-19 | Resolved: 2023-11-11

## 2023-11-18 DIAGNOSIS — Z51.81 ALTERATION IN ANTICOAGULATION: ICD-10-CM

## 2023-11-18 DIAGNOSIS — I48.11 LONGSTANDING PERSISTENT ATRIAL FIBRILLATION (HCC): ICD-10-CM

## 2023-11-18 DIAGNOSIS — Z79.01 ALTERATION IN ANTICOAGULATION: ICD-10-CM

## 2023-11-22 ENCOUNTER — TELEPHONE (OUTPATIENT)
Facility: CLINIC | Age: 88
End: 2023-11-22

## 2023-11-22 DIAGNOSIS — I48.91 ATRIAL FIBRILLATION, UNSPECIFIED TYPE (HCC): ICD-10-CM

## 2023-11-22 LAB
INR PPP: 1.6 (ref 0.9–1.2)
PROTHROMBIN TIME: 17.2 SEC (ref 9.1–12)

## 2023-11-22 NOTE — TELEPHONE ENCOUNTER
----- Message from Idania Prather DO sent at 11/22/2023 10:34 AM EST -----  Can we call to ask what dose of warfarin patient is taking? Her INR has not budged in 1 month, so I just want to make sure she has been increasing her dose of warfarin as I've recommended. I see where Dr. Linden Page also made some recommendations on the INR 2 weeks ago which was different than what I had relayed to patient. The latest dose that I recommended was warfarin 3mg Mon/Wed/Fri/Sat/Sun and 5 mg Tues/Thurs. This was the recommended dose on 11/11. Is patient taking that currently?

## 2023-11-24 DIAGNOSIS — I48.91 ATRIAL FIBRILLATION, UNSPECIFIED TYPE (HCC): ICD-10-CM

## 2023-11-24 RX ORDER — WARFARIN SODIUM 1 MG/1
TABLET ORAL
Qty: 280 TABLET | Refills: 3
Start: 2023-11-24

## 2023-11-24 NOTE — TELEPHONE ENCOUNTER
I spoke with patient's daughter Jimenezvaleria Duffy and advised of providers changes to warfarin and the need for labs in 1 week . She stated understanding.

## 2023-11-24 NOTE — TELEPHONE ENCOUNTER
I spoke with the patient and she is taking the warfarin as you have recommended.       3mg [id number]  5mg T-TH

## 2023-11-24 NOTE — TELEPHONE ENCOUNTER
So, if that's the case, my recommendation would be to increase the dose again to 4mg daily (every day). I'm going to order another INR to be checked in 1 week.

## 2023-12-02 LAB
INR PPP: 2.3 (ref 0.9–1.2)
PROTHROMBIN TIME: 23.2 SEC (ref 9.1–12)

## 2023-12-05 ENCOUNTER — TELEPHONE (OUTPATIENT)
Facility: CLINIC | Age: 88
End: 2023-12-05

## 2023-12-05 NOTE — TELEPHONE ENCOUNTER
I left a vm advising pt of INR results and the need to recheck labs in 2 weeks.  Order has been sent to [address] [place] [address]

## 2023-12-05 NOTE — TELEPHONE ENCOUNTER
----- Message from Flakita Key DO sent at 12/4/2023  7:57 AM EST -----  The INR looked good. Please continue your current dose of warfarin. We should recheck this in 2 weeks. I will send the order to [facility].

## 2023-12-06 ENCOUNTER — TELEPHONE (OUTPATIENT)
Facility: CLINIC | Age: 88
End: 2023-12-06

## 2023-12-06 NOTE — TELEPHONE ENCOUNTER
Patients daughter, Lemmie Mcardle, called in regards to a text mentioning that her mom needed to schedule a CT scan that was ordered by [facility]. Lemmie Mcardle doesn't know why the CT was ordered and is just trying to get some info so she understands what's going on with her mom. Lemmie Mcardle is leaving to go out of the country on Friday so really wants to talk to someone before then.      [phone] Lemmie Mcardle

## 2023-12-07 NOTE — TELEPHONE ENCOUNTER
Pt dtr called stating that she received a text about a scheduling an imaging appt and when she called, it was for a CT that she was not aware of. Notified pt that per the recent lab results, staff had spoken w/ pt and notified her of needing a CT of the chest. She verbalized understanding and states she will call to schedule.

## 2023-12-18 DIAGNOSIS — I48.91 ATRIAL FIBRILLATION, UNSPECIFIED TYPE (HCC): ICD-10-CM

## 2023-12-20 LAB
INR PPP: 2.2 (ref 0.9–1.2)
PROTHROMBIN TIME: 22.4 SEC (ref 9.1–12)

## 2023-12-21 ENCOUNTER — OFFICE VISIT (OUTPATIENT)
Facility: CLINIC | Age: 88
End: 2023-12-21
Payer: MEDICARE

## 2023-12-21 VITALS
WEIGHT: 174 LBS | TEMPERATURE: 97.6 F | SYSTOLIC BLOOD PRESSURE: 131 MMHG | RESPIRATION RATE: 18 BRPM | HEIGHT: 63 IN | DIASTOLIC BLOOD PRESSURE: 72 MMHG | HEART RATE: 67 BPM | OXYGEN SATURATION: 96 % | BODY MASS INDEX: 30.83 KG/M2

## 2023-12-21 DIAGNOSIS — R73.03 PREDIABETES: ICD-10-CM

## 2023-12-21 DIAGNOSIS — F41.9 ANXIETY: ICD-10-CM

## 2023-12-21 DIAGNOSIS — D68.69 SECONDARY HYPERCOAGULABLE STATE (HCC): ICD-10-CM

## 2023-12-21 DIAGNOSIS — I48.91 ATRIAL FIBRILLATION, UNSPECIFIED TYPE (HCC): Primary | ICD-10-CM

## 2023-12-21 DIAGNOSIS — E87.6 HYPOKALEMIA: ICD-10-CM

## 2023-12-21 PROCEDURE — G8484 FLU IMMUNIZE NO ADMIN: HCPCS | Performed by: FAMILY MEDICINE

## 2023-12-21 PROCEDURE — 4004F PT TOBACCO SCREEN RCVD TLK: CPT | Performed by: FAMILY MEDICINE

## 2023-12-21 PROCEDURE — 99214 OFFICE O/P EST MOD 30 MIN: CPT | Performed by: FAMILY MEDICINE

## 2023-12-21 PROCEDURE — G8417 CALC BMI ABV UP PARAM F/U: HCPCS | Performed by: FAMILY MEDICINE

## 2023-12-21 PROCEDURE — G8427 DOCREV CUR MEDS BY ELIG CLIN: HCPCS | Performed by: FAMILY MEDICINE

## 2023-12-21 PROCEDURE — 1123F ACP DISCUSS/DSCN MKR DOCD: CPT | Performed by: FAMILY MEDICINE

## 2023-12-21 PROCEDURE — 1090F PRES/ABSN URINE INCON ASSESS: CPT | Performed by: FAMILY MEDICINE

## 2023-12-21 RX ORDER — DILTIAZEM HYDROCHLORIDE 240 MG/1
CAPSULE, EXTENDED RELEASE ORAL
COMMUNITY
Start: 2023-11-03 | End: 2023-12-21

## 2023-12-21 RX ORDER — DILTIAZEM HYDROCHLORIDE 120 MG/1
CAPSULE, COATED, EXTENDED RELEASE ORAL
COMMUNITY
Start: 2023-09-30 | End: 2023-12-21

## 2023-12-21 NOTE — PROGRESS NOTES
1. Have you been to the ER, urgent care clinic since your last visit?  Hospitalized since your last visit?No    2. Have you seen or consulted any other health care providers outside of the [facility] since your last visit?  Include any pap smears or colon screening. No    Chief Complaint   Patient presents with    Follow-up    Discuss Labs       /72 (Site: Right Upper Arm, Position: Sitting, Cuff Size: Large Adult)   Pulse 67   Temp 97.6 °F (36.4 °C) (Temporal)   Resp 18   Ht 1.6 m (5' 3\")   Wt 78.9 kg (174 lb)   SpO2 96%   BMI 30.82 kg/m²       
times daily 180 tablet 1    cyanocobalamin 1000 MCG/ML injection Inject 1 mL into the muscle every 30 days 3 mL 1    SYRINGE-NEEDLE, DISP, 3 ML 25G X 5/8\" 3 ML MISC Administer B12 intramuscularly once monthly. 50 each 0    busPIRone (BUSPAR) 5 MG tablet Take 1 tablet by mouth 2 times daily 180 tablet 1    levothyroxine (SYNTHROID) 125 MCG tablet TAKE 1 TABLET BY MOUTH IN THE  MORNING 90 tablet 3    anastrozole (ARIMIDEX) 1 MG tablet Take 1 tablet by mouth daily      Calcium Carbonate-Vitamin D (OYSTER SHELL CALCIUM/D) 500-5 MG-MCG TABS Take 1 tablet by mouth daily      DULoxetine (CYMBALTA) 60 MG extended release capsule TAKE 1 CAPSULE BY MOUTH  DAILY AND 2ND DOSE AS  NEEDED      esomeprazole (NEXIUM) 20 MG delayed release capsule Take 1 capsule by mouth daily      meclizine (ANTIVERT) 12.5 MG tablet Take 1 tablet by mouth 2 times daily as needed      clonazePAM (KLONOPIN) 0.5 MG tablet Take 0.5 tablets by mouth 2 times daily as needed for Anxiety for up to 60 days. Max Daily Amount: 0.5 mg 30 tablet 0     No current facility-administered medications on file prior to visit.     Allergies   Allergen Reactions    Codeine Hives     Review of Systems   Constitutional: Negative.          Objective  Vitals:    12/21/23 1329   BP: 131/72   Pulse: 67   Resp: 18   Temp: 97.6 °F (36.4 °C)   SpO2: 96%     Physical Exam  Constitutional:       General: She is not in acute distress.     Appearance: Normal appearance. She is not ill-appearing.   HENT:      Head: Normocephalic and atraumatic.   Eyes:      Extraocular Movements: Extraocular movements intact.      Conjunctiva/sclera: Conjunctivae normal.   Cardiovascular:      Rate and Rhythm: Normal rate and regular rhythm.      Heart sounds: Normal heart sounds.   Pulmonary:      Effort: Pulmonary effort is normal. No respiratory distress.      Breath sounds: Normal breath sounds.   Musculoskeletal:         General: Normal range of motion.      Cervical back: Normal range of

## 2024-01-16 DIAGNOSIS — I48.91 ATRIAL FIBRILLATION, UNSPECIFIED TYPE (HCC): ICD-10-CM

## 2024-01-16 DIAGNOSIS — Z51.81 ANTICOAGULATION GOAL OF INR 2 TO 3: ICD-10-CM

## 2024-01-16 DIAGNOSIS — E53.8 B12 DEFICIENCY: Primary | ICD-10-CM

## 2024-01-16 DIAGNOSIS — Z79.01 ANTICOAGULATION GOAL OF INR 2 TO 3: ICD-10-CM

## 2024-01-17 LAB
ALBUMIN SERPL-MCNC: 4.6 G/DL (ref 3.6–4.6)
ALBUMIN/GLOB SERPL: 1.8 {RATIO} (ref 1.2–2.2)
ALP SERPL-CCNC: 67 IU/L (ref 44–121)
ALT SERPL-CCNC: 18 IU/L (ref 0–32)
AST SERPL-CCNC: 19 IU/L (ref 0–40)
BILIRUB SERPL-MCNC: 0.3 MG/DL (ref 0–1.2)
BUN SERPL-MCNC: 12 MG/DL (ref 10–36)
BUN/CREAT SERPL: 14 (ref 12–28)
CALCIUM SERPL-MCNC: 9.5 MG/DL (ref 8.7–10.3)
CHLORIDE SERPL-SCNC: 94 MMOL/L (ref 96–106)
CO2 SERPL-SCNC: 27 MMOL/L (ref 20–29)
CREAT SERPL-MCNC: 0.85 MG/DL (ref 0.57–1)
EGFRCR SERPLBLD CKD-EPI 2021: 65 ML/MIN/1.73
GLOBULIN SER CALC-MCNC: 2.5 G/DL (ref 1.5–4.5)
GLUCOSE SERPL-MCNC: 76 MG/DL (ref 70–99)
HBA1C MFR BLD: 6.3 % (ref 4.8–5.6)
INR PPP: 1.4 (ref 0.9–1.2)
POTASSIUM SERPL-SCNC: 4.1 MMOL/L (ref 3.5–5.2)
PROT SERPL-MCNC: 7.1 G/DL (ref 6–8.5)
PROTHROMBIN TIME: 14.5 SEC (ref 9.1–12)
SODIUM SERPL-SCNC: 136 MMOL/L (ref 134–144)

## 2024-01-18 DIAGNOSIS — E53.8 B12 DEFICIENCY: ICD-10-CM

## 2024-01-18 DIAGNOSIS — Z51.81 ANTICOAGULATION GOAL OF INR 2 TO 3: ICD-10-CM

## 2024-01-18 DIAGNOSIS — Z79.01 ANTICOAGULATION GOAL OF INR 2 TO 3: ICD-10-CM

## 2024-01-18 DIAGNOSIS — I48.91 ATRIAL FIBRILLATION, UNSPECIFIED TYPE (HCC): ICD-10-CM

## 2024-01-19 ENCOUNTER — TELEPHONE (OUTPATIENT)
Facility: CLINIC | Age: 89
End: 2024-01-19

## 2024-01-19 NOTE — TELEPHONE ENCOUNTER
----- Message from Yolette Morejon DO sent at 1/18/2024 10:21 AM EST -----  INR was too low this time. I recommend we repeat this in 1 week rather than make any changes to your dose since it had been so good for the last two checks. I will send the order to [facility]. I'm also adding the B12 level to that blood work.  A1c was slightly higher as compared to the last check in August. It is still within prediabetes range. I would recommend eating a healthy diet and getting some exercise. We can check this again in 6 months.   Electrolytes, kidney function, and liver enzymes were normal.

## 2024-01-19 NOTE — TELEPHONE ENCOUNTER
I spoke with the patients daughter Cassi and advised of lab results and providers recommendations. She stated understanding and will have the labs drawn next week.

## 2024-01-24 LAB
INR PPP: 1.5 (ref 0.9–1.2)
PROTHROMBIN TIME: 15.9 SEC (ref 9.1–12)
VIT B12 SERPL-MCNC: 744 PG/ML (ref 232–1245)

## 2024-01-25 ENCOUNTER — TELEPHONE (OUTPATIENT)
Facility: CLINIC | Age: 89
End: 2024-01-25

## 2024-01-25 DIAGNOSIS — Z51.81 ANTICOAGULATION GOAL OF INR 2 TO 3: ICD-10-CM

## 2024-01-25 DIAGNOSIS — Z79.01 ANTICOAGULATION GOAL OF INR 2 TO 3: ICD-10-CM

## 2024-01-25 DIAGNOSIS — I48.91 ATRIAL FIBRILLATION, UNSPECIFIED TYPE (HCC): ICD-10-CM

## 2024-01-25 NOTE — TELEPHONE ENCOUNTER
----- Message from Yolette Morejon DO sent at 2024  3:04 PM EST -----  INR was still low, so I would recommend you increase your dose to the followinmg M///Sat/Sun and 6mg T/.   We should repeat the INR in 1 week.

## 2024-01-26 DIAGNOSIS — I48.91 ATRIAL FIBRILLATION, UNSPECIFIED TYPE (HCC): ICD-10-CM

## 2024-01-26 NOTE — TELEPHONE ENCOUNTER
Requested Prescriptions     Pending Prescriptions Disp Refills    dilTIAZem (TIAZAC) 240 MG extended release capsule [Pharmacy Med Name: DILTIAZEM HCL ER 240MG CER] 180 capsule 0     Sig: TAKE 2 CAPSULES BY MOUTH DAILY

## 2024-01-29 RX ORDER — DILTIAZEM HYDROCHLORIDE 240 MG/1
480 CAPSULE, EXTENDED RELEASE ORAL DAILY
Qty: 180 CAPSULE | Refills: 0 | OUTPATIENT
Start: 2024-01-29

## 2024-01-29 NOTE — TELEPHONE ENCOUNTER
Patient should have at least one refill left...I sent 6 months' worth in November. She should just need to go to her pharmacy for a refill.

## 2024-02-01 DIAGNOSIS — I48.91 ATRIAL FIBRILLATION, UNSPECIFIED TYPE (HCC): ICD-10-CM

## 2024-02-01 NOTE — TELEPHONE ENCOUNTER
Requested Prescriptions     Pending Prescriptions Disp Refills    warfarin (COUMADIN) 1 MG tablet 280 tablet 3     Sig: Take 4mg daily.    DULoxetine (CYMBALTA) 60 MG extended release capsule 30 capsule      Sig: TAKE 1 CAPSULE BY MOUTH  DAILY AND 2ND DOSE AS  NEEDED    levothyroxine (SYNTHROID) 125 MCG tablet 90 tablet 3     Sig: Take 1 tablet by mouth every morning    dilTIAZem (TIAZAC) 120 MG extended release capsule 180 capsule 1     Sig: Take 2 capsules by mouth daily

## 2024-02-01 NOTE — TELEPHONE ENCOUNTER
Patient called to notify office of Medication updates:    - Warfarin 1mg    -Duloxetine 60mg    -Lezothyroxine 125mg    -Diltazem 240mg    Pharmacy: [facility] mail order     Patient can be reached at:[phone]    Patient last seen:12/21/23      Any of the remaining medication will go to the [facility] pharmacy

## 2024-02-02 RX ORDER — LEVOTHYROXINE SODIUM 0.12 MG/1
125 TABLET ORAL EVERY MORNING
Qty: 90 TABLET | Refills: 3 | Status: SHIPPED | OUTPATIENT
Start: 2024-02-02

## 2024-02-02 RX ORDER — WARFARIN SODIUM 4 MG/1
TABLET ORAL
Qty: 90 TABLET | Refills: 3 | Status: SHIPPED | OUTPATIENT
Start: 2024-02-02 | End: 2024-02-02

## 2024-02-02 RX ORDER — WARFARIN SODIUM 1 MG/1
TABLET ORAL
Qty: 300 TABLET | Refills: 3 | Status: SHIPPED | OUTPATIENT
Start: 2024-02-02

## 2024-02-02 RX ORDER — DULOXETIN HYDROCHLORIDE 60 MG/1
CAPSULE, DELAYED RELEASE ORAL
Qty: 180 CAPSULE | Refills: 1 | Status: SHIPPED | OUTPATIENT
Start: 2024-02-02

## 2024-02-02 RX ORDER — WARFARIN SODIUM 1 MG/1
TABLET ORAL
Qty: 90 TABLET | Refills: 3 | Status: SHIPPED | OUTPATIENT
Start: 2024-02-02 | End: 2024-02-02

## 2024-02-02 RX ORDER — DILTIAZEM HYDROCHLORIDE 120 MG/1
240 CAPSULE, EXTENDED RELEASE ORAL DAILY
Qty: 180 CAPSULE | Refills: 1 | Status: SHIPPED | OUTPATIENT
Start: 2024-02-02

## 2024-02-02 NOTE — TELEPHONE ENCOUNTER
If there's any confusion, I had to correct the warfarin prescription that I sent to the mail pharmacy.

## 2024-02-07 LAB
INR PPP: 1.5 (ref 0.9–1.2)
PROTHROMBIN TIME: 16.2 SEC (ref 9.1–12)

## 2024-02-14 ENCOUNTER — TELEPHONE (OUTPATIENT)
Facility: CLINIC | Age: 89
End: 2024-02-14

## 2024-02-14 NOTE — TELEPHONE ENCOUNTER
I spoke with Cassi the patients daughter and advised of INR results and providers recommendations. She stated understanding.

## 2024-02-14 NOTE — TELEPHONE ENCOUNTER
----- Message from Yolette Morejon, DO sent at 2/14/2024 12:41 PM EST -----  The INR was again low. Please change dosing to 5mg once daily. WE should repeat the INR in one week.

## 2024-02-21 LAB
INR PPP: 1.8 (ref 0.9–1.2)
PROTHROMBIN TIME: 18.3 SEC (ref 9.1–12)

## 2024-02-23 ENCOUNTER — TELEPHONE (OUTPATIENT)
Facility: CLINIC | Age: 89
End: 2024-02-23

## 2024-02-23 DIAGNOSIS — I48.91 ATRIAL FIBRILLATION, UNSPECIFIED TYPE (HCC): Primary | ICD-10-CM

## 2024-02-23 DIAGNOSIS — Z79.01 ANTICOAGULATED ON WARFARIN: ICD-10-CM

## 2024-02-23 NOTE — TELEPHONE ENCOUNTER
Spoke to patient's daughter informed of results and providers recommendations.  Patient's daughter   voiced understanding.

## 2024-02-23 NOTE — TELEPHONE ENCOUNTER
----- Message from Yolette Morejon, DO sent at 2/23/2024  7:53 AM EST -----  INR was slightly better but still not at goal. Please increase your warfarin dosing to the following. 5mg daily M/W/F/Sat/Sun and 7mg T/Th. We need to recheck your INR in 1 week after you start this dose. We have a new INR machine so if you want to come to the office for a finger stick, we can do that. Let me know if you would [facility] so I can [facility] the order.  We will also want to talk about your diet at your next follow up.

## 2024-02-23 NOTE — TELEPHONE ENCOUNTER
Cassi Patients daughter called  [phone].  Patient would like to continue the INR getting drawn  at [facility].  Patient is due again 3/2024.  Thank you

## 2024-03-01 DIAGNOSIS — I48.91 ATRIAL FIBRILLATION, UNSPECIFIED TYPE (HCC): ICD-10-CM

## 2024-03-01 DIAGNOSIS — Z79.01 ANTICOAGULATED ON WARFARIN: ICD-10-CM

## 2024-03-06 LAB
INR PPP: 2.7 (ref 0.9–1.2)
PROTHROMBIN TIME: 27.2 SEC (ref 9.1–12)

## 2024-03-11 ENCOUNTER — TELEPHONE (OUTPATIENT)
Facility: CLINIC | Age: 89
End: 2024-03-11

## 2024-03-11 DIAGNOSIS — Z79.01 ANTICOAGULATED ON WARFARIN: ICD-10-CM

## 2024-03-11 DIAGNOSIS — I48.91 ATRIAL FIBRILLATION, UNSPECIFIED TYPE (HCC): ICD-10-CM

## 2024-03-11 NOTE — TELEPHONE ENCOUNTER
Spoke to patient's daughter informed of results and providers recommendations.  Patient's daughter voiced understanding.  Will go to lab on Tuesday March12,2024

## 2024-03-11 NOTE — TELEPHONE ENCOUNTER
----- Message from Yolette Morejon, DO sent at 3/11/2024 10:14 AM EDT -----  INR looked good! Let's keep your dose where it is. I've placed lab orders for INR. Please go by [facility] this week to have that rechecked.

## 2024-03-20 LAB
INR PPP: 1.7 (ref 0.9–1.2)
PROTHROMBIN TIME: 17.7 SEC (ref 9.1–12)

## 2024-03-21 ENCOUNTER — OFFICE VISIT (OUTPATIENT)
Facility: CLINIC | Age: 89
End: 2024-03-21
Payer: MEDICARE

## 2024-03-21 VITALS
HEIGHT: 63 IN | TEMPERATURE: 97.3 F | OXYGEN SATURATION: 97 % | BODY MASS INDEX: 31.36 KG/M2 | SYSTOLIC BLOOD PRESSURE: 135 MMHG | DIASTOLIC BLOOD PRESSURE: 68 MMHG | HEART RATE: 69 BPM | RESPIRATION RATE: 18 BRPM | WEIGHT: 177 LBS

## 2024-03-21 DIAGNOSIS — M51.37 DDD (DEGENERATIVE DISC DISEASE), LUMBOSACRAL: ICD-10-CM

## 2024-03-21 DIAGNOSIS — M50.30 DDD (DEGENERATIVE DISC DISEASE), CERVICAL: ICD-10-CM

## 2024-03-21 DIAGNOSIS — F41.9 ANXIETY: ICD-10-CM

## 2024-03-21 DIAGNOSIS — I48.91 ATRIAL FIBRILLATION, UNSPECIFIED TYPE (HCC): Primary | ICD-10-CM

## 2024-03-21 DIAGNOSIS — I48.91 ATRIAL FIBRILLATION, UNSPECIFIED TYPE (HCC): ICD-10-CM

## 2024-03-21 PROBLEM — C50.911 MALIGNANT NEOPLASM OF RIGHT FEMALE BREAST, UNSPECIFIED ESTROGEN RECEPTOR STATUS, UNSPECIFIED SITE OF BREAST (HCC): Status: ACTIVE | Noted: 2024-03-21

## 2024-03-21 PROCEDURE — G8427 DOCREV CUR MEDS BY ELIG CLIN: HCPCS | Performed by: FAMILY MEDICINE

## 2024-03-21 PROCEDURE — 1090F PRES/ABSN URINE INCON ASSESS: CPT | Performed by: FAMILY MEDICINE

## 2024-03-21 PROCEDURE — 1123F ACP DISCUSS/DSCN MKR DOCD: CPT | Performed by: FAMILY MEDICINE

## 2024-03-21 PROCEDURE — G8484 FLU IMMUNIZE NO ADMIN: HCPCS | Performed by: FAMILY MEDICINE

## 2024-03-21 PROCEDURE — 99214 OFFICE O/P EST MOD 30 MIN: CPT | Performed by: FAMILY MEDICINE

## 2024-03-21 PROCEDURE — G8417 CALC BMI ABV UP PARAM F/U: HCPCS | Performed by: FAMILY MEDICINE

## 2024-03-21 PROCEDURE — 4004F PT TOBACCO SCREEN RCVD TLK: CPT | Performed by: FAMILY MEDICINE

## 2024-03-21 RX ORDER — WARFARIN SODIUM 1 MG/1
TABLET ORAL
Qty: 1 TABLET | Refills: 0
Start: 2024-03-21 | End: 2024-03-28

## 2024-03-21 ASSESSMENT — PATIENT HEALTH QUESTIONNAIRE - PHQ9: DEPRESSION UNABLE TO ASSESS: PT REFUSES

## 2024-03-21 NOTE — PROGRESS NOTES
Subjective  Chief Complaint   Patient presents with    Follow-up     HPI:  Julissa Welch is a 89 y.o. female with medical problems as listed below who presents for follow up of chronic conditions.     Atrial fibrillation: Last INR subtherapeutic. Taking warfarin and diltiazem. She also takes propranolol PRN for sustained tachycardia according to daughter.     Anxiety: She says it is well controlled. Taking Buspar and Cymbalta.     Sciatica: She takes 3 Tylenol tabs for pain most days.     Patient Active Problem List   Diagnosis    Hypokalemia    Hyponatremia    Hypomagnesemia    Atrial fibrillation (HCC)    DDD (degenerative disc disease), cervical    DDD (degenerative disc disease), lumbosacral    Fibromyalgia    Hypothyroidism, adult    History of breast cancer    Pernicious anemia    Umbilical hernia without obstruction or gangrene    Splenic mass    Obesity    Anxiety    Prediabetes    GERD (gastroesophageal reflux disease)     Family History   Problem Relation Age of Onset    Breast Cancer Sister     Kidney Disease Other     Cancer Brother       Social History     Tobacco Use    Smoking status: Never    Smokeless tobacco: Current     Types: Snuff   Vaping Use    Vaping Use: Never used   Substance Use Topics    Alcohol use: Never    Drug use: Never     Current Outpatient Medications on File Prior to Visit   Medication Sig Dispense Refill    DULoxetine (CYMBALTA) 60 MG extended release capsule TAKE 1 CAPSULE BY MOUTH  DAILY AND 2ND DOSE AS  NEEDED 180 capsule 1    levothyroxine (SYNTHROID) 125 MCG tablet Take 1 tablet by mouth every morning 90 tablet 3    dilTIAZem (TIAZAC) 120 MG extended release capsule Take 2 capsules by mouth daily 180 capsule 1    Menthol (CEPACOL SORE THROAT) 5.4 MG LOZG Take 1 lozenge by mouth every 6 hours as needed (sore throat) 30 lozenge 0    propranolol (INDERAL) 10 MG tablet Take 1 tablet by mouth 2 times daily 180 tablet 1    cyanocobalamin 1000 MCG/ML injection Inject 1 mL into

## 2024-03-21 NOTE — PROGRESS NOTES
\"Have you been to the ER, urgent care clinic since your last visit?  Hospitalized since your last visit?\"    NO    “Have you seen or consulted any other health care providers outside of [facility] since your last visit?”    NO    Chief Complaint   Patient presents with    Follow-up     /68 (Site: Left Upper Arm, Position: Sitting, Cuff Size: Large Adult)   Pulse 69   Temp 97.3 °F (36.3 °C) (Temporal)   Resp 18   Ht 1.6 m (5' 3\")   Wt 80.3 kg (177 lb)   SpO2 97%   BMI 31.35 kg/m²               Click Here for Release of Records Request

## 2024-03-22 DIAGNOSIS — I48.11 LONGSTANDING PERSISTENT ATRIAL FIBRILLATION (HCC): Primary | ICD-10-CM

## 2024-03-22 NOTE — TELEPHONE ENCOUNTER
Patient called stating she has thought and decided she would like to change her medications.  Dr. Morejon and her discussed this in her last visit 3/21/24.  Please call patient at [phone]

## 2024-03-27 LAB
INR PPP: 1.5 (ref 0.9–1.2)
PROTHROMBIN TIME: 16.1 SEC (ref 9.1–12)

## 2024-03-27 NOTE — TELEPHONE ENCOUNTER
Requested Prescriptions     Pending Prescriptions Disp Refills    warfarin (COUMADIN) 1 MG tablet [Pharmacy Med Name: WARFARIN SODIUM 1MG TAB] 90 tablet 2     Sig: TAKE 1 TABLET BY MOUTH DAILY.

## 2024-03-27 NOTE — TELEPHONE ENCOUNTER
Patients daughter called and stated Dr. Morejon spoke about moving patient from Warfarin to Eliquis at last appointment and wanted to ask some questions.  Please call daughter at [phone].

## 2024-03-28 DIAGNOSIS — I48.11 LONGSTANDING PERSISTENT ATRIAL FIBRILLATION (HCC): ICD-10-CM

## 2024-03-28 NOTE — TELEPHONE ENCOUNTER
I would suggest we recheck an INR when she is able to  the Eliquis. If the INR is low enough, she can just start it that day and stop warfarin. But let's see if patient will be able to pick it up. Where does she want me to send the prescription?

## 2024-03-28 NOTE — TELEPHONE ENCOUNTER
Patient daughter stated you suggested changing the warfarin to eliquis and they are wanting to switch and how are they supposed to make that transition and could you send the [facility] [address]

## 2024-03-28 NOTE — TELEPHONE ENCOUNTER
Can you find out what patient's daughter's questions are regarding the medication they are interested in switching to?

## 2024-03-28 NOTE — TELEPHONE ENCOUNTER
Patient gets it delivered to her and they are done with the delivery today patients daughter stated with her insurance she wouldn't have to pay anything. Patients daughter wants it sent to the normal pharmacy and she wants a call after the inr is resulted and the go on starting the eliquis.

## 2024-03-28 NOTE — TELEPHONE ENCOUNTER
Patient did the INR this week 3/26 and wanted to know if that will be ok.  Please call patient at [phone] thank you

## 2024-03-29 NOTE — TELEPHONE ENCOUNTER
Patients daughter called and wants to know what the INR is for this week.  Please call daughter [phone] and  patient would like to know if she needs another INR next week and she would like her Eliquis to go to [facility]  not to [facility] and would like [facility] taken out.

## 2024-03-29 NOTE — TELEPHONE ENCOUNTER
The INR was 1.5 on 3/26 which is sub-therapeutic. I would suggest she recheck INR next week and depending on what it is, she might be able to start her Eliquis. I will reach out to her once her INR results next week.

## 2024-04-03 LAB
INR PPP: 1.9 (ref 0.9–1.2)
PROTHROMBIN TIME: 19.7 SEC (ref 9.1–12)

## 2024-04-05 ENCOUNTER — TELEPHONE (OUTPATIENT)
Facility: CLINIC | Age: 89
End: 2024-04-05

## 2024-04-05 NOTE — TELEPHONE ENCOUNTER
----- Message from Yolette Morejon, DO sent at 4/5/2024  7:47 AM EDT -----  Has patient been able to  Eliquis? If she has it, she can stop her warfarin and start the Eliquis.

## 2024-04-15 DIAGNOSIS — I48.91 ATRIAL FIBRILLATION, UNSPECIFIED TYPE (HCC): ICD-10-CM

## 2024-04-15 PROBLEM — D68.69 SECONDARY HYPERCOAGULABLE STATE (HCC): Status: RESOLVED | Noted: 2023-12-21 | Resolved: 2024-04-15

## 2024-04-15 PROBLEM — C50.911 MALIGNANT NEOPLASM OF RIGHT FEMALE BREAST, UNSPECIFIED ESTROGEN RECEPTOR STATUS, UNSPECIFIED SITE OF BREAST (HCC): Status: RESOLVED | Noted: 2024-03-21 | Resolved: 2024-04-15

## 2024-04-15 PROBLEM — M25.569 KNEE PAIN: Status: RESOLVED | Noted: 2021-07-02 | Resolved: 2024-04-15

## 2024-04-15 PROBLEM — M75.81 TENDINITIS OF RIGHT ROTATOR CUFF: Status: RESOLVED | Noted: 2021-07-02 | Resolved: 2024-04-15

## 2024-04-15 NOTE — TELEPHONE ENCOUNTER
[facility] called and a lot of her stuff was going thorough mail order she doesn't want that she want it to go to [place] he stated he called the mail order nothing is pending and I stated because she hasn't asked for any refills yet but the eliques that was sent to them and I  told him ill send over the ones filled from February to them and if she needs more she can call the office.     Requested Prescriptions     Pending Prescriptions Disp Refills    levothyroxine (SYNTHROID) 125 MCG tablet 90 tablet 3     Sig: Take 1 tablet by mouth every morning    DULoxetine (CYMBALTA) 60 MG extended release capsule 180 capsule 1     Sig: TAKE 1 CAPSULE BY MOUTH  DAILY AND 2ND DOSE AS  NEEDED    dilTIAZem (TIAZAC) 120 MG extended release capsule 180 capsule 1     Sig: Take 2 capsules by mouth daily

## 2024-04-18 RX ORDER — DILTIAZEM HYDROCHLORIDE 240 MG/1
240 CAPSULE, EXTENDED RELEASE ORAL DAILY
Qty: 90 CAPSULE | Refills: 1 | Status: SHIPPED | OUTPATIENT
Start: 2024-04-18

## 2024-04-18 RX ORDER — DULOXETIN HYDROCHLORIDE 60 MG/1
60 CAPSULE, DELAYED RELEASE ORAL DAILY
Qty: 90 CAPSULE | Refills: 1 | Status: SHIPPED | OUTPATIENT
Start: 2024-04-18

## 2024-04-18 RX ORDER — LEVOTHYROXINE SODIUM 0.12 MG/1
125 TABLET ORAL EVERY MORNING
Qty: 90 TABLET | Refills: 1 | Status: SHIPPED | OUTPATIENT
Start: 2024-04-18

## 2024-04-18 NOTE — TELEPHONE ENCOUNTER
I resent those medications as requested. I had to change a couple of things. She needs to understand that she should only take one Cymbalta daily and one diltiazem daily.

## 2024-07-15 NOTE — TELEPHONE ENCOUNTER
Requested Prescriptions     Pending Prescriptions Disp Refills    propranolol (INDERAL) 10 MG tablet [Pharmacy Med Name: PROPRANOLOL HYDROCHL 10MG TAB] 180 tablet 0     Sig: TAKE 1 TABLET BY MOUTH 2 TIMES DAILY

## 2024-07-16 RX ORDER — PROPRANOLOL HYDROCHLORIDE 10 MG/1
TABLET ORAL
Qty: 30 TABLET | Refills: 1 | Status: SHIPPED | OUTPATIENT
Start: 2024-07-16

## 2024-08-01 ENCOUNTER — OFFICE VISIT (OUTPATIENT)
Facility: CLINIC | Age: 89
End: 2024-08-01
Payer: MEDICARE

## 2024-08-01 VITALS
OXYGEN SATURATION: 95 % | BODY MASS INDEX: 30.12 KG/M2 | RESPIRATION RATE: 20 BRPM | TEMPERATURE: 95.6 F | HEART RATE: 98 BPM | DIASTOLIC BLOOD PRESSURE: 64 MMHG | SYSTOLIC BLOOD PRESSURE: 130 MMHG | HEIGHT: 63 IN | WEIGHT: 170 LBS

## 2024-08-01 DIAGNOSIS — I48.91 ATRIAL FIBRILLATION, UNSPECIFIED TYPE (HCC): ICD-10-CM

## 2024-08-01 DIAGNOSIS — E87.6 HYPOKALEMIA: ICD-10-CM

## 2024-08-01 DIAGNOSIS — Z79.01 ANTICOAGULATED: ICD-10-CM

## 2024-08-01 DIAGNOSIS — R73.03 PREDIABETES: ICD-10-CM

## 2024-08-01 DIAGNOSIS — M15.9 PRIMARY OSTEOARTHRITIS INVOLVING MULTIPLE JOINTS: Primary | ICD-10-CM

## 2024-08-01 DIAGNOSIS — E03.9 HYPOTHYROIDISM, ADULT: ICD-10-CM

## 2024-08-01 PROCEDURE — 4004F PT TOBACCO SCREEN RCVD TLK: CPT | Performed by: FAMILY MEDICINE

## 2024-08-01 PROCEDURE — G8427 DOCREV CUR MEDS BY ELIG CLIN: HCPCS | Performed by: FAMILY MEDICINE

## 2024-08-01 PROCEDURE — G8417 CALC BMI ABV UP PARAM F/U: HCPCS | Performed by: FAMILY MEDICINE

## 2024-08-01 PROCEDURE — 99215 OFFICE O/P EST HI 40 MIN: CPT | Performed by: FAMILY MEDICINE

## 2024-08-01 PROCEDURE — 1090F PRES/ABSN URINE INCON ASSESS: CPT | Performed by: FAMILY MEDICINE

## 2024-08-01 PROCEDURE — 1123F ACP DISCUSS/DSCN MKR DOCD: CPT | Performed by: FAMILY MEDICINE

## 2024-08-01 SDOH — ECONOMIC STABILITY: FOOD INSECURITY: WITHIN THE PAST 12 MONTHS, THE FOOD YOU BOUGHT JUST DIDN'T LAST AND YOU DIDN'T HAVE MONEY TO GET MORE.: PATIENT DECLINED

## 2024-08-01 SDOH — ECONOMIC STABILITY: FOOD INSECURITY: WITHIN THE PAST 12 MONTHS, YOU WORRIED THAT YOUR FOOD WOULD RUN OUT BEFORE YOU GOT MONEY TO BUY MORE.: PATIENT DECLINED

## 2024-08-01 SDOH — ECONOMIC STABILITY: INCOME INSECURITY: HOW HARD IS IT FOR YOU TO PAY FOR THE VERY BASICS LIKE FOOD, HOUSING, MEDICAL CARE, AND HEATING?: PATIENT DECLINED

## 2024-08-01 ASSESSMENT — PATIENT HEALTH QUESTIONNAIRE - PHQ9
1. LITTLE INTEREST OR PLEASURE IN DOING THINGS: SEVERAL DAYS
SUM OF ALL RESPONSES TO PHQ QUESTIONS 1-9: 11
9. THOUGHTS THAT YOU WOULD BE BETTER OFF DEAD, OR OF HURTING YOURSELF: NOT AT ALL
3. TROUBLE FALLING OR STAYING ASLEEP: SEVERAL DAYS
5. POOR APPETITE OR OVEREATING: SEVERAL DAYS
4. FEELING TIRED OR HAVING LITTLE ENERGY: MORE THAN HALF THE DAYS
6. FEELING BAD ABOUT YOURSELF - OR THAT YOU ARE A FAILURE OR HAVE LET YOURSELF OR YOUR FAMILY DOWN: NEARLY EVERY DAY
SUM OF ALL RESPONSES TO PHQ QUESTIONS 1-9: 11
SUM OF ALL RESPONSES TO PHQ QUESTIONS 1-9: 11
SUM OF ALL RESPONSES TO PHQ9 QUESTIONS 1 & 2: 3
2. FEELING DOWN, DEPRESSED OR HOPELESS: MORE THAN HALF THE DAYS
7. TROUBLE CONCENTRATING ON THINGS, SUCH AS READING THE NEWSPAPER OR WATCHING TELEVISION: SEVERAL DAYS
10. IF YOU CHECKED OFF ANY PROBLEMS, HOW DIFFICULT HAVE THESE PROBLEMS MADE IT FOR YOU TO DO YOUR WORK, TAKE CARE OF THINGS AT HOME, OR GET ALONG WITH OTHER PEOPLE: VERY DIFFICULT
SUM OF ALL RESPONSES TO PHQ QUESTIONS 1-9: 11
8. MOVING OR SPEAKING SO SLOWLY THAT OTHER PEOPLE COULD HAVE NOTICED. OR THE OPPOSITE, BEING SO FIGETY OR RESTLESS THAT YOU HAVE BEEN MOVING AROUND A LOT MORE THAN USUAL: NOT AT ALL

## 2024-08-01 NOTE — PROGRESS NOTES
\"Have you been to the ER, urgent care clinic since your last visit?  Hospitalized since your last visit?\"    NO    “Have you seen or consulted any other health care providers outside of [facility] since your last visit?”    NO  Chief Complaint   Patient presents with    Follow-up     /64 (Site: Right Upper Arm, Position: Sitting, Cuff Size: Large Adult)   Pulse 98   Temp (!) 95.6 °F (35.3 °C) (Temporal)   Resp 20   Ht 1.6 m (5' 3\")   Wt 77.1 kg (170 lb)   SpO2 95%   BMI 30.11 kg/m²               Click Here for Release of Records Request    
by mouth every morning 90 tablet 1    DULoxetine (CYMBALTA) 60 MG extended release capsule Take 1 capsule by mouth daily TAKE 1 CAPSULE BY MOUTH  DAILY AND 2ND DOSE AS  NEEDED 90 capsule 1    dilTIAZem (TIAZAC) 240 MG extended release capsule Take 1 capsule by mouth daily 90 capsule 1    apixaban (ELIQUIS) 5 MG TABS tablet Take 1 tablet by mouth 2 times daily 180 tablet 1    Menthol (CEPACOL SORE THROAT) 5.4 MG LOZG Take 1 lozenge by mouth every 6 hours as needed (sore throat) 30 lozenge 0    cyanocobalamin 1000 MCG/ML injection Inject 1 mL into the muscle every 30 days 3 mL 1    SYRINGE-NEEDLE, DISP, 3 ML 25G X 5/8\" 3 ML MISC Administer B12 intramuscularly once monthly. 50 each 0    busPIRone (BUSPAR) 5 MG tablet Take 1 tablet by mouth 2 times daily 180 tablet 1    anastrozole (ARIMIDEX) 1 MG tablet Take 1 tablet by mouth daily      Calcium Carbonate-Vitamin D (OYSTER SHELL CALCIUM/D) 500-5 MG-MCG TABS Take 1 tablet by mouth daily      esomeprazole (NEXIUM) 20 MG delayed release capsule Take 1 capsule by mouth daily      meclizine (ANTIVERT) 12.5 MG tablet Take 1 tablet by mouth 2 times daily as needed       No current facility-administered medications on file prior to visit.     Allergies   Allergen Reactions    Codeine Hives     Review of Systems   Constitutional: Negative.    Musculoskeletal:  Positive for arthralgias.         Objective  Vitals:    08/01/24 0925   BP: 130/64   Pulse: 98   Resp: 20   Temp: (!) 95.6 °F (35.3 °C)   SpO2: 95%     Physical Exam  Constitutional:       General: She is not in acute distress.     Appearance: Normal appearance. She is not ill-appearing.   HENT:      Head: Normocephalic and atraumatic.   Eyes:      Extraocular Movements: Extraocular movements intact.      Conjunctiva/sclera: Conjunctivae normal.   Pulmonary:      Effort: Pulmonary effort is normal. No respiratory distress.   Skin:     General: Skin is warm and dry.   Neurological:      General: No focal deficit present.

## 2024-08-02 LAB
ALBUMIN SERPL-MCNC: 4.4 G/DL (ref 3.6–4.6)
ALP SERPL-CCNC: 70 IU/L (ref 44–121)
ALT SERPL-CCNC: 13 IU/L (ref 0–32)
AST SERPL-CCNC: 20 IU/L (ref 0–40)
BILIRUB SERPL-MCNC: 0.6 MG/DL (ref 0–1.2)
BUN SERPL-MCNC: 9 MG/DL (ref 10–36)
BUN/CREAT SERPL: 12 (ref 12–28)
CALCIUM SERPL-MCNC: 9.9 MG/DL (ref 8.7–10.3)
CHLORIDE SERPL-SCNC: 92 MMOL/L (ref 96–106)
CO2 SERPL-SCNC: 29 MMOL/L (ref 20–29)
CREAT SERPL-MCNC: 0.73 MG/DL (ref 0.57–1)
EGFRCR SERPLBLD CKD-EPI 2021: 78 ML/MIN/1.73
ERYTHROCYTE [DISTWIDTH] IN BLOOD BY AUTOMATED COUNT: 12.3 % (ref 11.7–15.4)
GLOBULIN SER CALC-MCNC: 2.3 G/DL (ref 1.5–4.5)
GLUCOSE SERPL-MCNC: 104 MG/DL (ref 70–99)
HBA1C MFR BLD: 6.1 % (ref 4.8–5.6)
HCT VFR BLD AUTO: 41.5 % (ref 34–46.6)
HGB BLD-MCNC: 13.8 G/DL (ref 11.1–15.9)
MCH RBC QN AUTO: 30.6 PG (ref 26.6–33)
MCHC RBC AUTO-ENTMCNC: 33.3 G/DL (ref 31.5–35.7)
MCV RBC AUTO: 92 FL (ref 79–97)
PLATELET # BLD AUTO: 255 X10E3/UL (ref 150–450)
POTASSIUM SERPL-SCNC: 3.9 MMOL/L (ref 3.5–5.2)
PROT SERPL-MCNC: 6.7 G/DL (ref 6–8.5)
RBC # BLD AUTO: 4.51 X10E6/UL (ref 3.77–5.28)
SODIUM SERPL-SCNC: 135 MMOL/L (ref 134–144)
T4 FREE SERPL-MCNC: 1.44 NG/DL (ref 0.82–1.77)
TSH SERPL DL<=0.005 MIU/L-ACNC: 1.41 UIU/ML (ref 0.45–4.5)
WBC # BLD AUTO: 9.1 X10E3/UL (ref 3.4–10.8)

## 2024-08-12 PROBLEM — M15.9 PRIMARY OSTEOARTHRITIS INVOLVING MULTIPLE JOINTS: Status: ACTIVE | Noted: 2024-08-12

## 2024-08-12 PROBLEM — M15.0 PRIMARY OSTEOARTHRITIS INVOLVING MULTIPLE JOINTS: Status: ACTIVE | Noted: 2024-08-12

## 2024-09-05 NOTE — TELEPHONE ENCOUNTER
Requested Prescriptions     Pending Prescriptions Disp Refills    DULoxetine (CYMBALTA) 60 MG extended release capsule [Pharmacy Med Name: DULoxetine 60MG CAP] 90 capsule 0     Sig: TAKE 1 CAPSULE BY MOUTH ONCE DAILY

## 2024-09-08 RX ORDER — DULOXETIN HYDROCHLORIDE 60 MG/1
60 CAPSULE, DELAYED RELEASE ORAL DAILY
Qty: 90 CAPSULE | Refills: 1 | Status: SHIPPED | OUTPATIENT
Start: 2024-09-08

## 2024-09-08 RX ORDER — CYANOCOBALAMIN 1000 UG/ML
INJECTION, SOLUTION INTRAMUSCULAR; SUBCUTANEOUS
Qty: 1 ML | Refills: 5 | Status: SHIPPED | OUTPATIENT
Start: 2024-09-08

## 2024-09-23 RX ORDER — BUSPIRONE HYDROCHLORIDE 5 MG/1
5 TABLET ORAL 2 TIMES DAILY
Qty: 180 TABLET | Refills: 1 | Status: SHIPPED | OUTPATIENT
Start: 2024-09-23

## 2024-10-23 NOTE — TELEPHONE ENCOUNTER
Requested Prescriptions     Pending Prescriptions Disp Refills    levothyroxine (SYNTHROID) 125 MCG tablet [Pharmacy Med Name: LEVOTHYROXINE SODIUM 125MCG TAB] 90 tablet 0     Sig: TAKE 1 TABLET BY MOUTH EVERY MORNING     Date of last OV:08/01/2024  Future OV visit scheduled:  [x] Yes -> Date: 10/28/2024  [] No    Last Refill: [] N/A  Date:04/18/2024  Qty:90  # of refills:1    Med pending for provider review:  [] Yes  [] No (provide reason why):     Requested Pharmacy updated in ENC:  [] Yes    Applicable labs (provide date of completion):  [x] Diabetic med- A1c:  [] Cholesterol med- Lipids:  [x] BP med- CMP or BMP:   [x] Thyroid med- TSH:  [] Gout med- Uric acid:  [] Prostate med- PSA:  [] Other (provide what type of med and lab):  08/01/2024  Additional notes:

## 2024-10-28 ENCOUNTER — OFFICE VISIT (OUTPATIENT)
Facility: CLINIC | Age: 88
End: 2024-10-28

## 2024-10-28 VITALS
RESPIRATION RATE: 16 BRPM | BODY MASS INDEX: 28.7 KG/M2 | HEART RATE: 64 BPM | TEMPERATURE: 98 F | HEIGHT: 63 IN | OXYGEN SATURATION: 94 % | WEIGHT: 162 LBS | DIASTOLIC BLOOD PRESSURE: 61 MMHG | SYSTOLIC BLOOD PRESSURE: 113 MMHG

## 2024-10-28 DIAGNOSIS — D51.0 PERNICIOUS ANEMIA: ICD-10-CM

## 2024-10-28 DIAGNOSIS — F41.9 ANXIETY: ICD-10-CM

## 2024-10-28 DIAGNOSIS — Z23 IMMUNIZATION DUE: ICD-10-CM

## 2024-10-28 DIAGNOSIS — M79.7 FIBROMYALGIA: ICD-10-CM

## 2024-10-28 DIAGNOSIS — Z00.00 MEDICARE ANNUAL WELLNESS VISIT, SUBSEQUENT: Primary | ICD-10-CM

## 2024-10-28 DIAGNOSIS — R42 DIZZINESS: ICD-10-CM

## 2024-10-28 DIAGNOSIS — M15.0 PRIMARY OSTEOARTHRITIS INVOLVING MULTIPLE JOINTS: ICD-10-CM

## 2024-10-28 DIAGNOSIS — R63.4 UNINTENTIONAL WEIGHT LOSS: ICD-10-CM

## 2024-10-28 RX ORDER — LEVOTHYROXINE SODIUM 125 UG/1
125 TABLET ORAL EVERY MORNING
Qty: 90 TABLET | Refills: 1 | Status: SHIPPED | OUTPATIENT
Start: 2024-10-28

## 2024-10-28 RX ORDER — CYANOCOBALAMIN 1000 UG/ML
1000 INJECTION, SOLUTION INTRAMUSCULAR; SUBCUTANEOUS
Qty: 3 ML | Refills: 1 | Status: SHIPPED | OUTPATIENT
Start: 2024-10-28

## 2024-10-28 RX ORDER — MECLIZINE HCL 12.5 MG 12.5 MG/1
12.5 TABLET ORAL 2 TIMES DAILY PRN
Qty: 30 TABLET | Refills: 1 | Status: SHIPPED | OUTPATIENT
Start: 2024-10-28

## 2024-10-28 ASSESSMENT — PATIENT HEALTH QUESTIONNAIRE - PHQ9
SUM OF ALL RESPONSES TO PHQ QUESTIONS 1-9: 15
9. THOUGHTS THAT YOU WOULD BE BETTER OFF DEAD, OR OF HURTING YOURSELF: NOT AT ALL
SUM OF ALL RESPONSES TO PHQ9 QUESTIONS 1 & 2: 2
SUM OF ALL RESPONSES TO PHQ QUESTIONS 1-9: 15
7. TROUBLE CONCENTRATING ON THINGS, SUCH AS READING THE NEWSPAPER OR WATCHING TELEVISION: SEVERAL DAYS
3. TROUBLE FALLING OR STAYING ASLEEP: SEVERAL DAYS
1. LITTLE INTEREST OR PLEASURE IN DOING THINGS: SEVERAL DAYS
6. FEELING BAD ABOUT YOURSELF - OR THAT YOU ARE A FAILURE OR HAVE LET YOURSELF OR YOUR FAMILY DOWN: NEARLY EVERY DAY
4. FEELING TIRED OR HAVING LITTLE ENERGY: NEARLY EVERY DAY
8. MOVING OR SPEAKING SO SLOWLY THAT OTHER PEOPLE COULD HAVE NOTICED. OR THE OPPOSITE, BEING SO FIGETY OR RESTLESS THAT YOU HAVE BEEN MOVING AROUND A LOT MORE THAN USUAL: MORE THAN HALF THE DAYS
SUM OF ALL RESPONSES TO PHQ QUESTIONS 1-9: 15
2. FEELING DOWN, DEPRESSED OR HOPELESS: SEVERAL DAYS
5. POOR APPETITE OR OVEREATING: NEARLY EVERY DAY
SUM OF ALL RESPONSES TO PHQ QUESTIONS 1-9: 15
10. IF YOU CHECKED OFF ANY PROBLEMS, HOW DIFFICULT HAVE THESE PROBLEMS MADE IT FOR YOU TO DO YOUR WORK, TAKE CARE OF THINGS AT HOME, OR GET ALONG WITH OTHER PEOPLE: SOMEWHAT DIFFICULT

## 2024-10-28 NOTE — PROGRESS NOTES
\"Have you been to the ER, urgent care clinic since your last visit?  Hospitalized since your last visit?\"    NO    “Have you seen or consulted any other health care providers outside our system since your last visit?”    NO      Chief Complaint   Patient presents with    Medicare AWV     /61 (Site: Left Upper Arm, Position: Sitting, Cuff Size: Large Adult)   Pulse 64   Temp 98 °F (36.7 °C) (Temporal)   Resp 16   Ht 1.6 m (5' 3\")   Wt 73.5 kg (162 lb)   SpO2 94%   BMI 28.70 kg/m²            
Laundry, Housekeeping, Banking/Finances, Shopping, Telephone Use, Food Preparation, Transportation, Taking Medications    Advanced Directives:  Do you have a Living Will?: (!) No         Objective   Vitals:    10/28/24 1410   BP: 113/61   Site: Left Upper Arm   Position: Sitting   Cuff Size: Large Adult   Pulse: 64   Resp: 16   Temp: 98 °F (36.7 °C)   TempSrc: Temporal   SpO2: 94%   Weight: 73.5 kg (162 lb)   Height: 1.6 m (5' 3\")      Body mass index is 28.7 kg/m².     Patient-Reported Vitals  No data recorded   General Appearance: alert and oriented to person, place and time, well developed and well- nourished, in no acute distress  Skin: warm and dry, no rash or erythema  Head: normocephalic and atraumatic  Eyes: extraocular eye movements intact, conjunctivae normal  Pulmonary/Chest: clear to auscultation bilaterally- no wheezes, rales or rhonchi, normal air movement, no respiratory distress  Cardiovascular: normal rate, regular rhythm, normal S1 and S2, no murmurs, rubs, clicks, or gallops  Musculoskeletal: normal range of motion, no joint swelling, deformity or tenderness  Neurologic: no cranial nerve deficit, gait, coordination and speech normal            Allergies   Allergen Reactions    Codeine Hives     Prior to Visit Medications    Medication Sig Taking? Authorizing Provider   levothyroxine (SYNTHROID) 125 MCG tablet TAKE 1 TABLET BY MOUTH EVERY MORNING Yes Yolette Morejon DO   meclizine (ANTIVERT) 12.5 MG tablet Take 1 tablet by mouth 2 times daily as needed for Dizziness or Nausea Yes Yolette Morejon DO   cyanocobalamin 1000 MCG/ML injection Inject 1 mL into the muscle every 30 days Yes Yolette Morejon DO   busPIRone (BUSPAR) 5 MG tablet TAKE 1 TABLET BY MOUTH 2 TIMES DAILY Yes Yolette Morejon DO   DULoxetine (CYMBALTA) 60 MG extended release capsule TAKE 1 CAPSULE BY MOUTH ONCE DAILY Yes Yolette Morejon DO   propranolol (INDERAL) 10 MG tablet Take 1 tablet by mouth twice

## 2024-11-26 DIAGNOSIS — I48.91 ATRIAL FIBRILLATION, UNSPECIFIED TYPE (HCC): ICD-10-CM

## 2024-11-26 NOTE — TELEPHONE ENCOUNTER
Requested Prescriptions     Pending Prescriptions Disp Refills    dilTIAZem (TIAZAC) 240 MG extended release capsule [Pharmacy Med Name: dilTIAZem HCL ER 240MG CER] 90 capsule 0     Sig: TAKE 1 CAPSULE BY MOUTH DAILY

## 2024-12-02 RX ORDER — DILTIAZEM HYDROCHLORIDE 240 MG/1
240 CAPSULE, EXTENDED RELEASE ORAL DAILY
Qty: 90 CAPSULE | Refills: 0 | Status: SHIPPED | OUTPATIENT
Start: 2024-12-02

## 2025-01-03 ENCOUNTER — OFFICE VISIT (OUTPATIENT)
Facility: CLINIC | Age: 89
End: 2025-01-03

## 2025-01-03 VITALS
HEIGHT: 63 IN | WEIGHT: 163 LBS | DIASTOLIC BLOOD PRESSURE: 67 MMHG | RESPIRATION RATE: 16 BRPM | SYSTOLIC BLOOD PRESSURE: 121 MMHG | TEMPERATURE: 97.9 F | BODY MASS INDEX: 28.88 KG/M2 | HEART RATE: 71 BPM | OXYGEN SATURATION: 96 %

## 2025-01-03 DIAGNOSIS — E03.9 HYPOTHYROIDISM, ADULT: ICD-10-CM

## 2025-01-03 DIAGNOSIS — Z23 IMMUNIZATION DUE: Primary | ICD-10-CM

## 2025-01-03 DIAGNOSIS — R63.4 WEIGHT LOSS: ICD-10-CM

## 2025-01-03 DIAGNOSIS — H81.11 BENIGN PAROXYSMAL POSITIONAL VERTIGO OF RIGHT EAR: ICD-10-CM

## 2025-01-03 RX ORDER — CLONAZEPAM 0.5 MG/1
0.5 TABLET ORAL 2 TIMES DAILY PRN
COMMUNITY

## 2025-01-03 RX ORDER — MECLIZINE HCL 12.5 MG 12.5 MG/1
12.5 TABLET ORAL 2 TIMES DAILY PRN
Qty: 30 TABLET | Refills: 1 | Status: SHIPPED | OUTPATIENT
Start: 2025-01-03

## 2025-01-03 RX ORDER — DOCUSATE SODIUM 100 MG/1
100 CAPSULE, LIQUID FILLED ORAL DAILY PRN
COMMUNITY

## 2025-01-03 ASSESSMENT — PATIENT HEALTH QUESTIONNAIRE - PHQ9: DEPRESSION UNABLE TO ASSESS: FUNCTIONAL CAPACITY MOTIVATION LIMITS ACCURACY

## 2025-01-03 NOTE — PROGRESS NOTES
\"Have you been to the ER, urgent care clinic since your last visit?  Hospitalized since your last visit?\"    NO    “Have you seen or consulted any other health care providers outside our system since your last visit?”    NO    Chief Complaint   Patient presents with    Follow-up     /67 (Site: Left Upper Arm, Position: Sitting, Cuff Size: Large Adult)   Pulse 71   Temp 97.9 °F (36.6 °C) (Temporal)   Resp 16   Ht 1.6 m (5' 3\")   Wt 73.9 kg (163 lb)   SpO2 96%   BMI 28.87 kg/m²

## 2025-01-03 NOTE — PROGRESS NOTES
[facility]  Subjective  Chief Complaint   Patient presents with    Follow-up     HPI:  Julissa Welch  : 1933    PCP: Yolette Morejon DO    History of Present Illness  The patient is a 89-year-old female who presents for a follow-up visit. She is a patient of Dr. Morejon who is out on maternity leave at this time. Dr. Morejon was worried about her weight loss and had given instructions to allow eating whatever her heart desired along with supplemental shakes. Her last visit was a Medicare annual wellness visit.    She reports persistent pain in her hips and legs, which is not alleviated by her current regimen of Tylenol 500 mg, taken 6 times daily. She has been diagnosed with arthritis in one leg and sciatica in the other, as per Dr. Hines's orthopedic evaluation. Previous attempts at pain management through injections were unsuccessful, providing relief for only 2 to 3 days. She expresses concern about potential falls due to leg weakness.    Her appetite has improved slightly, although she still experiences periods of disinterest in food. She occasionally consumes protein shakes but notes that they induce constipation. Despite efforts to maintain hydration by consuming 8 bottles of water daily, she continues to experience constipation. She has been on a daily dose of Colace since her last visit, as recommended by Dr. Morejon. She has a history of laxative use, including MiraLAX.    She also reports regular dizziness, particularly when lying down and turning her head. This symptom has been present for the past 3 to 4 months, following a long period of stability. She has been managing this with over-the-counter meclizine, which she finds more effective than prescription meclizine. She has been performing the Epley maneuver, as instructed by an ENT specialist several years ago.    She expresses a desire for a treatment to enhance her energy levels, as she often requires a 2 to

## 2025-02-21 RX ORDER — DULOXETIN HYDROCHLORIDE 60 MG/1
60 CAPSULE, DELAYED RELEASE ORAL DAILY
Qty: 90 CAPSULE | Refills: 1 | Status: SHIPPED | OUTPATIENT
Start: 2025-02-21

## 2025-03-05 DIAGNOSIS — I48.91 ATRIAL FIBRILLATION, UNSPECIFIED TYPE (HCC): ICD-10-CM

## 2025-03-06 RX ORDER — LEVOTHYROXINE SODIUM 125 UG/1
125 TABLET ORAL EVERY MORNING
Qty: 90 TABLET | Refills: 0 | Status: SHIPPED | OUTPATIENT
Start: 2025-03-06

## 2025-03-06 RX ORDER — DILTIAZEM HYDROCHLORIDE 240 MG/1
240 CAPSULE, EXTENDED RELEASE ORAL DAILY
Qty: 90 CAPSULE | Refills: 0 | Status: SHIPPED | OUTPATIENT
Start: 2025-03-06

## 2025-03-06 RX ORDER — BUSPIRONE HYDROCHLORIDE 5 MG/1
5 TABLET ORAL 2 TIMES DAILY
Qty: 180 TABLET | Refills: 0 | Status: SHIPPED | OUTPATIENT
Start: 2025-03-06 | End: 2025-03-06

## 2025-03-06 RX ORDER — BUSPIRONE HYDROCHLORIDE 5 MG/1
5 TABLET ORAL 2 TIMES DAILY
Qty: 180 TABLET | Refills: 0 | Status: SHIPPED | OUTPATIENT
Start: 2025-03-06

## 2025-03-06 RX ORDER — APIXABAN 5 MG/1
5 TABLET, FILM COATED ORAL 2 TIMES DAILY
Qty: 180 TABLET | Refills: 0 | Status: SHIPPED | OUTPATIENT
Start: 2025-03-06

## 2025-03-06 RX ORDER — DULOXETIN HYDROCHLORIDE 60 MG/1
60 CAPSULE, DELAYED RELEASE ORAL DAILY
Qty: 90 CAPSULE | Refills: 0 | OUTPATIENT
Start: 2025-03-06

## 2025-03-06 NOTE — TELEPHONE ENCOUNTER
Requested Prescriptions     Pending Prescriptions Disp Refills    ELIQUIS 5 MG TABS tablet [Pharmacy Med Name: ELIQUIS 5MG TAB] 180 tablet 0     Sig: TAKE 1 TABLET BY MOUTH 2 TIMES DAILY    busPIRone (BUSPAR) 5 MG tablet [Pharmacy Med Name: busPIRone HCL 5MG TAB] 180 tablet 0     Sig: TAKE ONE TABLET BY MOUTH TWICE A DAY    levothyroxine (SYNTHROID) 125 MCG tablet [Pharmacy Med Name: LEVOTHYROXINE SODIUM 125MCG TAB] 90 tablet 0     Sig: TAKE 1 TABLET BY MOUTH EVERY MORNING    DULoxetine (CYMBALTA) 60 MG extended release capsule [Pharmacy Med Name: DULoxetine 60MG CAP] 90 capsule 0     Sig: TAKE 1 CAPSULE BY MOUTH ONCE DAILY     Date of last OV: 01.03.2025  Future OV visit scheduled:  [x] Yes -> Date: 07.11.2025  [] No    Last Refill: [] N/A - eliquis  Date: 03.29.2024  Qty: 180  # of refills: 1    Last Refill: [] N/A - buspar  Date: 09.23.2024  Qty: 180  # of refills: 1    Last Refill: [] N/A - levothyroxine   Date: 10.28.2024  Qty: 90  # of refills: 1    Last Refill: [] N/A - cymbalta  Date: 02.21.2025  Qty: 90  # of refills: 1  - did not send for refill to [facility] pending for provider review:  [x] Yes  [] No (provide reason why):     Requested Pharmacy updated in ENC:  [] Yes    Applicable labs (provide date of completion):  [] Diabetic med- A1c:  [] Cholesterol med- Lipids:  [] BP med- CMP or BMP:   [] Thyroid med- TSH:  [] Gout med- Uric acid:  [] Prostate med- PSA:  [] Other (provide what type of med and lab):    Additional notes:

## 2025-03-06 NOTE — TELEPHONE ENCOUNTER
Requested Prescriptions     Pending Prescriptions Disp Refills    dilTIAZem (TIAZAC) 240 MG extended release capsule [Pharmacy Med Name: dilTIAZem HCL ER 240MG CER] 90 capsule 0     Sig: TAKE 1 CAPSULE BY MOUTH DAILY          Date of last OV:1/3/25  Future OV visit scheduled:  [x] Yes -> Date: 7/11/25  [] No    Last Refill: [] N/A  Date:12/2/24  Qty:90  # of refills:0    Med pending for provider review:  [x] Yes  [] No (provide reason why):     Requested Pharmacy updated in ENC:  [x] Yes    Applicable labs (provide date of completion):  [] Diabetic med- A1c:  [] Cholesterol med- Lipids:  [] BP med- CMP or BMP:   [] Thyroid med- TSH:  [] Gout med- Uric acid:  [] Prostate med- PSA:  [] Other (provide what type of med and lab):    Additional notes:

## 2025-03-06 NOTE — TELEPHONE ENCOUNTER
Requested Prescriptions     Pending Prescriptions Disp Refills    busPIRone (BUSPAR) 5 MG tablet [Pharmacy Med Name: busPIRone HCL 5MG TAB] 180 tablet 0     Sig: TAKE 1 TABLET BY MOUTH 2 TIMES DAILY     Date of last OV: 01.03.2025  Future OV visit scheduled:  [x] Yes -> Date: 07.11.2025  [] No    Last Refill: [] N/A  Date:  09.23.2024  Qty: 180  # of refills: 1    Med pending for provider review:  [x] Yes  [] No (provide reason why):     Requested Pharmacy updated in ENC:  [] Yes    Applicable labs (provide date of completion):  [] Diabetic med- A1c:  [] Cholesterol med- Lipids:  [] BP med- CMP or BMP:   [] Thyroid med- TSH:  [] Gout med- Uric acid:  [] Prostate med- PSA:  [] Other (provide what type of med and lab):    Additional notes:

## 2025-04-10 NOTE — TELEPHONE ENCOUNTER
Requested Prescriptions     Pending Prescriptions Disp Refills    ELIQUIS 5 MG TABS tablet [Pharmacy Med Name: ELIQUIS 5MG TAB] 180 tablet 0     Sig: TAKE 1 TABLET BY MOUTH 2 TIMES DAILY

## 2025-04-11 RX ORDER — APIXABAN 5 MG/1
5 TABLET, FILM COATED ORAL 2 TIMES DAILY
Qty: 180 TABLET | Refills: 1 | Status: SHIPPED | OUTPATIENT
Start: 2025-04-11

## 2025-05-19 DIAGNOSIS — I48.91 ATRIAL FIBRILLATION, UNSPECIFIED TYPE (HCC): ICD-10-CM

## 2025-05-19 NOTE — TELEPHONE ENCOUNTER
Requested Prescriptions     Pending Prescriptions Disp Refills    dilTIAZem (CARDIZEM CD) 240 MG extended release capsule [Pharmacy Med Name: dilTIAZem HYDROCHLOR 240MG CAP] 90 capsule      Sig: TAKE 1 CAPSULE BY MOUTH DAILY

## 2025-05-22 RX ORDER — DILTIAZEM HYDROCHLORIDE 240 MG/1
240 CAPSULE, COATED, EXTENDED RELEASE ORAL DAILY
Qty: 90 CAPSULE | Refills: 1 | Status: SHIPPED | OUTPATIENT
Start: 2025-05-22

## 2025-06-08 DIAGNOSIS — H81.11 BENIGN PAROXYSMAL POSITIONAL VERTIGO OF RIGHT EAR: ICD-10-CM

## 2025-06-09 NOTE — TELEPHONE ENCOUNTER
Requested Prescriptions     Pending Prescriptions Disp Refills    meclizine (ANTIVERT) 12.5 MG tablet [Pharmacy Med Name: MECLIZINE HYDROCHLOR 12.5MG TAB] 30 tablet 0     Sig: TAKE 1 TABLET BY MOUTH 2 TIMES DAILY AS NEEDED FOR DIZZINESS OR NAUSEA    busPIRone (BUSPAR) 5 MG tablet [Pharmacy Med Name: busPIRone HCL 5MG TAB] 180 tablet 0     Sig: TAKE 1 TABLET BY MOUTH 2 TIMES DAILY    levothyroxine (SYNTHROID) 125 MCG tablet [Pharmacy Med Name: LEVOTHYROXINE SODIUM 125MCG TAB] 90 tablet 0     Sig: TAKE 1 TABLET BY MOUTH EVERY MORNING

## 2025-06-12 RX ORDER — MECLIZINE HCL 12.5 MG 12.5 MG/1
12.5 TABLET ORAL 2 TIMES DAILY PRN
Qty: 30 TABLET | Refills: 0 | Status: SHIPPED | OUTPATIENT
Start: 2025-06-12

## 2025-06-12 RX ORDER — LEVOTHYROXINE SODIUM 125 UG/1
125 TABLET ORAL EVERY MORNING
Qty: 90 TABLET | Refills: 0 | Status: SHIPPED | OUTPATIENT
Start: 2025-06-12

## 2025-06-12 RX ORDER — BUSPIRONE HYDROCHLORIDE 5 MG/1
5 TABLET ORAL 2 TIMES DAILY
Qty: 180 TABLET | Refills: 0 | Status: SHIPPED | OUTPATIENT
Start: 2025-06-12

## 2025-07-01 RX ORDER — FLUTICASONE PROPIONATE 50 MCG
2 SPRAY, SUSPENSION (ML) NASAL DAILY PRN
Qty: 1 EACH | Refills: 5 | Status: SHIPPED | OUTPATIENT
Start: 2025-07-01

## 2025-07-11 ENCOUNTER — OFFICE VISIT (OUTPATIENT)
Facility: CLINIC | Age: 89
End: 2025-07-11
Payer: MEDICAID

## 2025-07-11 VITALS
WEIGHT: 159.4 LBS | BODY MASS INDEX: 28.24 KG/M2 | TEMPERATURE: 97 F | SYSTOLIC BLOOD PRESSURE: 122 MMHG | HEART RATE: 76 BPM | DIASTOLIC BLOOD PRESSURE: 58 MMHG | HEIGHT: 63 IN | RESPIRATION RATE: 18 BRPM

## 2025-07-11 DIAGNOSIS — D51.0 PERNICIOUS ANEMIA: ICD-10-CM

## 2025-07-11 DIAGNOSIS — E87.1 HYPONATREMIA: ICD-10-CM

## 2025-07-11 DIAGNOSIS — R04.0 EPISTAXIS: ICD-10-CM

## 2025-07-11 DIAGNOSIS — I48.91 ATRIAL FIBRILLATION, UNSPECIFIED TYPE (HCC): ICD-10-CM

## 2025-07-11 DIAGNOSIS — R73.03 PREDIABETES: ICD-10-CM

## 2025-07-11 DIAGNOSIS — Z85.828 PERSONAL HISTORY OF SKIN CANCER: ICD-10-CM

## 2025-07-11 DIAGNOSIS — E87.6 HYPOKALEMIA: ICD-10-CM

## 2025-07-11 DIAGNOSIS — H61.92 SKIN LESION OF LEFT EAR: ICD-10-CM

## 2025-07-11 DIAGNOSIS — E03.9 HYPOTHYROIDISM, ADULT: Primary | ICD-10-CM

## 2025-07-11 PROCEDURE — 1123F ACP DISCUSS/DSCN MKR DOCD: CPT | Performed by: FAMILY MEDICINE

## 2025-07-11 PROCEDURE — 99214 OFFICE O/P EST MOD 30 MIN: CPT | Performed by: FAMILY MEDICINE

## 2025-07-11 SDOH — ECONOMIC STABILITY: FOOD INSECURITY: WITHIN THE PAST 12 MONTHS, YOU WORRIED THAT YOUR FOOD WOULD RUN OUT BEFORE YOU GOT MONEY TO BUY MORE.: NEVER TRUE

## 2025-07-11 SDOH — ECONOMIC STABILITY: FOOD INSECURITY: WITHIN THE PAST 12 MONTHS, THE FOOD YOU BOUGHT JUST DIDN'T LAST AND YOU DIDN'T HAVE MONEY TO GET MORE.: NEVER TRUE

## 2025-07-11 ASSESSMENT — PATIENT HEALTH QUESTIONNAIRE - PHQ9
SUM OF ALL RESPONSES TO PHQ QUESTIONS 1-9: 0
2. FEELING DOWN, DEPRESSED OR HOPELESS: NOT AT ALL
SUM OF ALL RESPONSES TO PHQ QUESTIONS 1-9: 0
SUM OF ALL RESPONSES TO PHQ QUESTIONS 1-9: 0
1. LITTLE INTEREST OR PLEASURE IN DOING THINGS: NOT AT ALL
SUM OF ALL RESPONSES TO PHQ QUESTIONS 1-9: 0

## 2025-07-11 NOTE — PROGRESS NOTES
Have you been to the ER, urgent care clinic since your last visit?  Hospitalized since your last visit?   NO    Have you seen or consulted any other health care providers outside our system since your last visit?   NO           Chief Complaint   Patient presents with    Hypothyroidism     BP (!) 122/58 (BP Site: Left Upper Arm, Patient Position: Sitting, BP Cuff Size: Large Adult)   Pulse 76   Temp 97 °F (36.1 °C) (Temporal)   Resp 18   Ht 1.6 m (5' 3\")   Wt 72.3 kg (159 lb 6.4 oz)   BMI 28.24 kg/m²

## 2025-07-11 NOTE — PROGRESS NOTES
Subjective  Chief Complaint   Patient presents with    Hypothyroidism     HPI:  Julissa Welch is a 89 y.o. female with medical problems as listed below who presents for follow up of chronic conditions.   History of Present Illness  She experienced a nosebleed approximately 1 to 2 weeks ago, which was severe enough to warrant a call to emergency services. The bleeding had subsided by the time they arrived. She was advised to use a humidifier and was prescribed a nasal spray, which she has been using twice daily. Since then, she has not experienced any further nosebleeds. She is currently on Eliquis.    She reports that her ear tends to curl up during sleep, causing discomfort and she has a sore that will not heal. This issue has been ongoing for several months. 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 She applies Neosporin to the affected area.    She also mentions a painful finger, which she believes may be due to a fungal infection under the nail. She has been applying an antifungal medication, which seems to have improved the condition. This is her first experience with such a problem. Her other nails are unaffected.    She had a consultation with a cardiologist about a month ago, during which an echocardiogram was performed. She has an upcoming appointment to discuss the results. She is currently taking propranolol twice daily, once in the morning and once at night.    She is requesting refills for B12.    Patient Active Problem List   Diagnosis    Hypokalemia    Hyponatremia    Hypomagnesemia    Atrial fibrillation (HCC)    DDD (degenerative disc disease), cervical    DDD (degenerative disc disease), lumbosacral    Fibromyalgia    Hypothyroidism, adult    History of breast cancer    Pernicious anemia    Umbilical hernia without obstruction or gangrene    Splenic mass    Obesity

## 2025-07-12 LAB
ALBUMIN SERPL-MCNC: 4.5 G/DL (ref 3.6–4.6)
ALP SERPL-CCNC: 67 IU/L (ref 44–121)
ALT SERPL-CCNC: 12 IU/L (ref 0–32)
AST SERPL-CCNC: 21 IU/L (ref 0–40)
BILIRUB SERPL-MCNC: 0.4 MG/DL (ref 0–1.2)
BUN SERPL-MCNC: 9 MG/DL (ref 10–36)
BUN/CREAT SERPL: 13 (ref 12–28)
CALCIUM SERPL-MCNC: 10 MG/DL (ref 8.7–10.3)
CHLORIDE SERPL-SCNC: 90 MMOL/L (ref 96–106)
CO2 SERPL-SCNC: 23 MMOL/L (ref 20–29)
CREAT SERPL-MCNC: 0.71 MG/DL (ref 0.57–1)
EGFRCR SERPLBLD CKD-EPI 2021: 80 ML/MIN/1.73
ERYTHROCYTE [DISTWIDTH] IN BLOOD BY AUTOMATED COUNT: 12 % (ref 11.7–15.4)
EST. AVERAGE GLUCOSE BLD GHB EST-MCNC: 114 MG/DL
GLOBULIN SER CALC-MCNC: 2.5 G/DL (ref 1.5–4.5)
GLUCOSE SERPL-MCNC: 85 MG/DL (ref 70–99)
HBA1C MFR BLD: 5.6 % (ref 4.8–5.6)
HCT VFR BLD AUTO: 41.5 % (ref 34–46.6)
HGB BLD-MCNC: 13.7 G/DL (ref 11.1–15.9)
MCH RBC QN AUTO: 31.1 PG (ref 26.6–33)
MCHC RBC AUTO-ENTMCNC: 33 G/DL (ref 31.5–35.7)
MCV RBC AUTO: 94 FL (ref 79–97)
PLATELET # BLD AUTO: 268 X10E3/UL (ref 150–450)
POTASSIUM SERPL-SCNC: 4.5 MMOL/L (ref 3.5–5.2)
PROT SERPL-MCNC: 7 G/DL (ref 6–8.5)
RBC # BLD AUTO: 4.41 X10E6/UL (ref 3.77–5.28)
SODIUM SERPL-SCNC: 131 MMOL/L (ref 134–144)
TSH SERPL DL<=0.005 MIU/L-ACNC: 2.31 UIU/ML (ref 0.45–4.5)
VIT B12 SERPL-MCNC: 1580 PG/ML (ref 232–1245)
WBC # BLD AUTO: 10.1 X10E3/UL (ref 3.4–10.8)

## 2025-07-16 NOTE — TELEPHONE ENCOUNTER
Requested Prescriptions     Pending Prescriptions Disp Refills    propranolol (INDERAL) 10 MG immediate release tablet [Pharmacy Med Name: PROPRANOLOL HYDROCHL 10MG TAB] 30 tablet 0     Sig: TAKE 1 TABLET BY MOUTH TWICE DAILY AS NEEDED FOR ELEVATED HEART RATE.

## 2025-07-18 ENCOUNTER — RESULTS FOLLOW-UP (OUTPATIENT)
Facility: CLINIC | Age: 89
End: 2025-07-18

## 2025-07-18 DIAGNOSIS — E87.1 HYPONATREMIA: Primary | ICD-10-CM

## 2025-07-18 RX ORDER — PROPRANOLOL HYDROCHLORIDE 10 MG/1
TABLET ORAL
Qty: 30 TABLET | Refills: 0 | Status: SHIPPED | OUTPATIENT
Start: 2025-07-18

## 2025-07-22 NOTE — TELEPHONE ENCOUNTER
----- Message from Dr. Yolette Morejon, DO sent at 7/18/2025  7:52 AM EDT -----  Vitamin B12 levels were adequate. Please continue your B12 supplement.   Kidney function and liver enzymes were normal.  Your electrolyte levels were slightly low. Have you been eating and drinking well?  I would recommend we recheck these levels next week if you would come by the lab. I'll place the orders.  Thyroid function was normal. Please continue levothyroxine as prescribed.   Blood counts were normal.   A1c was normal range.

## 2025-07-22 NOTE — TELEPHONE ENCOUNTER
I spoke with patients daughters Cassi and advised of lab results. She stated the patient does not eat or drink much . She would also like to know if she can be prescribed pill form of B 12 instead of the injections.

## 2025-07-29 DIAGNOSIS — D51.0 PERNICIOUS ANEMIA: ICD-10-CM

## 2025-07-29 DIAGNOSIS — H81.11 BENIGN PAROXYSMAL POSITIONAL VERTIGO OF RIGHT EAR: ICD-10-CM

## 2025-07-30 ENCOUNTER — TELEPHONE (OUTPATIENT)
Facility: CLINIC | Age: 89
End: 2025-07-30

## 2025-07-31 RX ORDER — LEVOTHYROXINE SODIUM 125 UG/1
125 TABLET ORAL EVERY MORNING
Qty: 90 TABLET | Refills: 3 | Status: SHIPPED | OUTPATIENT
Start: 2025-07-31

## 2025-07-31 RX ORDER — CYANOCOBALAMIN 1000 UG/ML
INJECTION, SOLUTION INTRAMUSCULAR; SUBCUTANEOUS
Qty: 1 ML | Refills: 5 | Status: SHIPPED | OUTPATIENT
Start: 2025-07-31

## 2025-07-31 RX ORDER — MECLIZINE HCL 12.5 MG 12.5 MG/1
12.5 TABLET ORAL 2 TIMES DAILY PRN
Qty: 30 TABLET | Refills: 1 | Status: SHIPPED | OUTPATIENT
Start: 2025-07-31

## 2025-07-31 RX ORDER — DULOXETIN HYDROCHLORIDE 60 MG/1
60 CAPSULE, DELAYED RELEASE ORAL DAILY
Qty: 90 CAPSULE | Refills: 1 | Status: SHIPPED | OUTPATIENT
Start: 2025-07-31 | End: 2025-08-01 | Stop reason: SDUPTHER

## 2025-08-01 ENCOUNTER — OFFICE VISIT (OUTPATIENT)
Facility: CLINIC | Age: 89
End: 2025-08-01

## 2025-08-01 VITALS
OXYGEN SATURATION: 96 % | HEART RATE: 75 BPM | RESPIRATION RATE: 17 BRPM | TEMPERATURE: 96.9 F | DIASTOLIC BLOOD PRESSURE: 60 MMHG | SYSTOLIC BLOOD PRESSURE: 108 MMHG | WEIGHT: 158 LBS | BODY MASS INDEX: 28 KG/M2 | HEIGHT: 63 IN

## 2025-08-01 DIAGNOSIS — M79.7 FIBROMYALGIA: ICD-10-CM

## 2025-08-01 DIAGNOSIS — N30.00 ACUTE CYSTITIS WITHOUT HEMATURIA: Primary | ICD-10-CM

## 2025-08-01 DIAGNOSIS — R30.0 DYSURIA: ICD-10-CM

## 2025-08-01 LAB
BILIRUBIN, URINE, POC: NEGATIVE
BLOOD URINE, POC: NEGATIVE
GLUCOSE URINE, POC: NEGATIVE
KETONES, URINE, POC: NEGATIVE
LEUKOCYTE ESTERASE, URINE, POC: ABNORMAL
NITRITE, URINE, POC: NEGATIVE
PH, URINE, POC: 7.5 (ref 4.6–8)
PROTEIN,URINE, POC: ABNORMAL
SPECIFIC GRAVITY, URINE, POC: 1.02 (ref 1–1.03)
URINALYSIS CLARITY, POC: CLEAR
URINALYSIS COLOR, POC: YELLOW
UROBILINOGEN, POC: ABNORMAL

## 2025-08-01 RX ORDER — NITROFURANTOIN 25; 75 MG/1; MG/1
100 CAPSULE ORAL 2 TIMES DAILY
Qty: 20 CAPSULE | Refills: 0 | Status: SHIPPED | OUTPATIENT
Start: 2025-08-01 | End: 2025-08-11

## 2025-08-01 RX ORDER — DULOXETIN HYDROCHLORIDE 60 MG/1
60 CAPSULE, DELAYED RELEASE ORAL DAILY
Qty: 90 CAPSULE | Refills: 1 | Status: SHIPPED | OUTPATIENT
Start: 2025-08-01

## 2025-08-01 NOTE — PROGRESS NOTES
Julissa Welch (: 1933) is a 89 y.o. female is here for evaluation of the following chief complaint(s): Urinary Pain and Lower Back Pain    Subjective/Objective:   She complains of dysuria, frequency, urgency for several days.  She denies burning with urination, nausea, vomiting, diarrhea.  Since starting she reports her symptoms are worsened. Treatments tried: increased water intake and cranberry pills or juice.    Pertinent items are noted in HPI.      Physical Exam:  General: alert, cooperative, and no distress  Abdomen: soft, nontender, nondistended, no masses or organomegaly  Back: CVA tenderness absent  : exam deferred   /60 (BP Site: Right Upper Arm, Patient Position: Sitting, BP Cuff Size: Medium Adult)   Pulse 75   Temp 96.9 °F (36.1 °C) (Temporal)   Resp 17   Ht 1.6 m (5' 3\")   Wt 71.7 kg (158 lb)   SpO2 96%   BMI 27.99 kg/m²     Assessment/Plan:   Assessment & Plan  Dysuria       ICD-10-CM    1. Acute cystitis without hematuria  N30.00 nitrofurantoin, macrocrystal-monohydrate, (MACROBID) 100 MG capsule     Culture, Urine      2. Dysuria  R30.0 AMB POC URINALYSIS DIP STICK AUTO W/O MICRO     nitrofurantoin, macrocrystal-monohydrate, (MACROBID) 100 MG capsule      3. Fibromyalgia  M79.7 DULoxetine (CYMBALTA) 60 MG extended release capsule              Will treat UTI with Macrobid  The above diagnosis is a acute on chronic problem.  We discussed expected course, resolution, and complications of diagnosis in detail.  I advised her to call back or return to office if symptoms worsen/change/persist.        No follow-ups on file.    On this date 2025 I have spent 31 minutes reviewing previous notes, test results, and other pertinent medical information with the patient, discussing the diagnosis and importance of compliance with the treatment plan as well as documenting on the day of the visit.  An electronic signature was used to authenticate this note.  -- JAYSON Abad

## 2025-08-01 NOTE — PROGRESS NOTES
Have you been to the ER, urgent care clinic since your last visit?  Hospitalized since your last visit?   NO    Have you seen or consulted any other health care providers outside our system since your last visit?   NO    Chief Complaint   Patient presents with    Urinary Pain       /60 (BP Site: Right Upper Arm, Patient Position: Sitting, BP Cuff Size: Medium Adult)   Pulse 75   Temp 96.9 °F (36.1 °C) (Temporal)   Resp 17   Ht 1.6 m (5' 3\")   Wt 71.7 kg (158 lb)   SpO2 96%   BMI 27.99 kg/m²          no

## 2025-08-03 LAB — BACTERIA UR CULT: NORMAL

## 2025-08-08 RX ORDER — PROPRANOLOL HYDROCHLORIDE 10 MG/1
TABLET ORAL
Qty: 30 TABLET | Refills: 0 | Status: SHIPPED | OUTPATIENT
Start: 2025-08-08

## 2025-08-15 DIAGNOSIS — I48.91 ATRIAL FIBRILLATION, UNSPECIFIED TYPE (HCC): ICD-10-CM

## 2025-08-15 RX ORDER — DILTIAZEM HYDROCHLORIDE 240 MG/1
240 CAPSULE, COATED, EXTENDED RELEASE ORAL DAILY
Qty: 90 CAPSULE | Refills: 1 | Status: SHIPPED | OUTPATIENT
Start: 2025-08-15

## 2025-08-16 LAB
APPEARANCE UR: CLEAR
BACTERIA #/AREA URNS HPF: NORMAL /[HPF]
BILIRUB UR QL STRIP: NEGATIVE
CASTS URNS QL MICRO: NORMAL /LPF
COLOR UR: YELLOW
EPI CELLS #/AREA URNS HPF: NORMAL /HPF (ref 0–10)
GLUCOSE UR QL STRIP: NEGATIVE
HGB UR QL STRIP: NEGATIVE
KETONES UR QL STRIP: NEGATIVE
LEUKOCYTE ESTERASE UR QL STRIP: NEGATIVE
MICRO URNS: NORMAL
MICRO URNS: NORMAL
NITRITE UR QL STRIP: NEGATIVE
PH UR STRIP: 7 [PH] (ref 5–7.5)
PROT UR QL STRIP: NEGATIVE
RBC #/AREA URNS HPF: NORMAL /HPF (ref 0–2)
SP GR UR STRIP: 1.01 (ref 1–1.03)
UROBILINOGEN UR STRIP-MCNC: 0.2 MG/DL (ref 0.2–1)
WBC #/AREA URNS HPF: NORMAL /HPF (ref 0–5)

## 2025-08-17 LAB — BACTERIA UR CULT: NORMAL

## 2025-08-20 LAB
BUN SERPL-MCNC: 11 MG/DL (ref 10–36)
BUN/CREAT SERPL: 14 (ref 12–28)
CALCIUM SERPL-MCNC: 10.1 MG/DL (ref 8.7–10.3)
CHLORIDE SERPL-SCNC: 86 MMOL/L (ref 96–106)
CO2 SERPL-SCNC: 24 MMOL/L (ref 20–29)
CREAT SERPL-MCNC: 0.8 MG/DL (ref 0.57–1)
EGFRCR SERPLBLD CKD-EPI 2021: 69 ML/MIN/1.73
GLUCOSE SERPL-MCNC: 96 MG/DL (ref 70–99)
POTASSIUM SERPL-SCNC: 4.3 MMOL/L (ref 3.5–5.2)
SODIUM SERPL-SCNC: 128 MMOL/L (ref 134–144)

## 2025-08-22 ENCOUNTER — TELEPHONE (OUTPATIENT)
Facility: CLINIC | Age: 89
End: 2025-08-22

## 2025-08-27 LAB
ALBUMIN SERPL-MCNC: 4.4 G/DL (ref 3.6–4.6)
ALP SERPL-CCNC: 77 IU/L (ref 44–121)
ALT SERPL-CCNC: 10 IU/L (ref 0–32)
AST SERPL-CCNC: 20 IU/L (ref 0–40)
BILIRUB SERPL-MCNC: 0.4 MG/DL (ref 0–1.2)
BUN SERPL-MCNC: 8 MG/DL (ref 10–36)
BUN/CREAT SERPL: 11 (ref 12–28)
CALCIUM SERPL-MCNC: 9.9 MG/DL (ref 8.7–10.3)
CHLORIDE SERPL-SCNC: 88 MMOL/L (ref 96–106)
CO2 SERPL-SCNC: 23 MMOL/L (ref 20–29)
CREAT SERPL-MCNC: 0.74 MG/DL (ref 0.57–1)
EGFRCR SERPLBLD CKD-EPI 2021: 76 ML/MIN/1.73
GLOBULIN SER CALC-MCNC: 2.7 G/DL (ref 1.5–4.5)
GLUCOSE SERPL-MCNC: 90 MG/DL (ref 70–99)
POTASSIUM SERPL-SCNC: 4.1 MMOL/L (ref 3.5–5.2)
PROT SERPL-MCNC: 7.1 G/DL (ref 6–8.5)
SODIUM SERPL-SCNC: 129 MMOL/L (ref 134–144)

## 2025-08-29 ENCOUNTER — TELEPHONE (OUTPATIENT)
Facility: CLINIC | Age: 89
End: 2025-08-29

## 2025-08-29 LAB — OSMOLALITY UR: 184 MOSMOL/KG

## 2025-08-30 LAB
SODIUM 24H UR-SRATE: 74 MMOL/24 HR (ref 23–207)
SODIUM UR-SCNC: 41 MMOL/L